# Patient Record
Sex: FEMALE | URBAN - NONMETROPOLITAN AREA
[De-identification: names, ages, dates, MRNs, and addresses within clinical notes are randomized per-mention and may not be internally consistent; named-entity substitution may affect disease eponyms.]

---

## 2021-12-07 ENCOUNTER — OUTSIDE SERVICES (OUTPATIENT)
Dept: FAMILY MEDICINE CLINIC | Age: 71
End: 2021-12-07

## 2021-12-07 DIAGNOSIS — R53.1 GENERALIZED WEAKNESS: ICD-10-CM

## 2021-12-07 DIAGNOSIS — U07.1 COVID-19: Primary | ICD-10-CM

## 2021-12-07 DIAGNOSIS — Z51.89 VISIT FOR WOUND CHECK: ICD-10-CM

## 2021-12-07 DIAGNOSIS — I48.11 LONGSTANDING PERSISTENT ATRIAL FIBRILLATION (HCC): ICD-10-CM

## 2021-12-07 PROBLEM — Z95.0 S/P PLACEMENT OF CARDIAC PACEMAKER: Status: ACTIVE | Noted: 2021-12-07

## 2021-12-14 ENCOUNTER — OUTSIDE SERVICES (OUTPATIENT)
Dept: FAMILY MEDICINE CLINIC | Age: 71
End: 2021-12-14

## 2021-12-14 DIAGNOSIS — R53.1 GENERALIZED WEAKNESS: ICD-10-CM

## 2021-12-14 DIAGNOSIS — U07.1 COVID-19: Primary | ICD-10-CM

## 2021-12-17 ENCOUNTER — TELEPHONE (OUTPATIENT)
Dept: FAMILY MEDICINE CLINIC | Age: 71
End: 2021-12-17

## 2021-12-17 NOTE — TELEPHONE ENCOUNTER
Incoming call from [address], pt has orders for insulin; Although pt doesn't have a diabetes dx and hasn't used insulin since being at the facility. Please advise.

## 2021-12-22 ASSESSMENT — ENCOUNTER SYMPTOMS
SHORTNESS OF BREATH: 0
DIARRHEA: 0
COUGH: 1
ABDOMINAL PAIN: 0
WHEEZING: 0
CONSTIPATION: 0
CHEST TIGHTNESS: 0

## 2021-12-22 NOTE — PROGRESS NOTES
[address]  [address] [address]  Dept: [phone]  Dept Fax: [phone]  Loc: [phone]    Uriel Machado  is a 73 y.o. female who presents today for her medical conditions/complaints as noted below. Uriel Machado is c/o of     Chief Complaint   Patient presents with    Extremity Weakness    Post-COVID Symptoms    Wound Check       HPI:   Toni Lima is being seen for her initial visit while at [facility]. Patient was admitted to [facility] on December 6, 2021, for rehab after being in the hospital with [facility]. She does not really feel like she is any better at this point, but she has only been here for two days. She continues to have a headache. She feels like she is coughing quite a bit and she is coughing a lot of mucus up. However, she is not feeling short of breath. She feels like therapy is going OK, but she has not had a lot of therapy yet and she has not noticed a huge change since starting therapy. She denies any problems with diarrhea. Patient reports that her appetite is quite good. She still generally feels quite weak. When she was healthy and living at home she was living with her brother and sister, who also contracted COVID and she is not quite sure how they are doing at this time. She said that when she was living at home, she did not need any assistive devices including a walker or cane, therefore her goal is to be able to be strong enough to walk on her own again in the near future. Past Medical History:   Diagnosis Date    Longstanding persistent atrial fibrillation ([address] 12/7/2021    S/P placement of cardiac pacemaker 12/7/2021     History reviewed. No pertinent surgical history. History reviewed. No pertinent family history.     Social History     Tobacco Use    Smoking status: Not on file    Smokeless tobacco: Not on file   Substance Use Topics    Alcohol use: Not on file      Prior to Visit Medications    Not on File     Not on File    Subjective:      Review of Systems   Constitutional: Negative for activity change, appetite change, chills, fatigue and fever. Respiratory: Positive for cough. Negative for chest tightness, shortness of breath and wheezing. Cardiovascular: Negative for chest pain, palpitations and leg swelling. Gastrointestinal: Negative for abdominal pain, constipation and diarrhea. Genitourinary: Negative for difficulty urinating. Skin: Negative for rash. Neurological: Positive for weakness and headaches. Negative for dizziness, syncope and light-headedness. Psychiatric/Behavioral: Positive for confusion and decreased concentration. Negative for behavioral problems, dysphoric mood and sleep disturbance. The patient is not nervous/anxious. Objective:     Physical Exam  Vitals and nursing note reviewed. Constitutional:       General: She is not in acute distress. Appearance: She is well-developed. Eyes:      Conjunctiva/sclera: Conjunctivae normal.   Neck:      Thyroid: No thyromegaly. Cardiovascular:      Rate and Rhythm: Normal rate and regular rhythm. Heart sounds: Normal heart sounds. No murmur heard. Pulmonary:      Effort: Pulmonary effort is normal. No respiratory distress. Breath sounds: Normal breath sounds. No wheezing. Abdominal:      General: Abdomen is flat. Bowel sounds are normal.      Palpations: Abdomen is soft. Tenderness: There is no abdominal tenderness. There is no guarding or rebound. Musculoskeletal:      Cervical back: Normal range of motion and neck supple. Lymphadenopathy:      Cervical: No cervical adenopathy. Skin:     General: Skin is warm and dry. Findings: No erythema or rash. Neurological:      General: No focal deficit present. Mental Status: She is alert. Mental status is at baseline.    Psychiatric:         Mood and Affect: Mood normal.         Speech: Speech normal.         Behavior: Behavior normal.         Cognition and Memory: Memory is impaired. Assessment:       Diagnosis Orders   1. COVID-19     2. Generalized weakness     3. Visit for wound check     4. Longstanding persistent atrial fibrillation ([address]               Plan:   1. COVID-19. Stable. She does not seem to be feeling significantly better yet. She continues to have a productive cough and she is still feeling very weak. At this point, she is not on oxygen and she is off of her antibiotics. I will continue to monitor. I will monitor closely for any signs of blood clotting including PE or DVT. 2. Generalized weakness. Stable. No significant improvement yet. She is working with therapy and she feels like it is going OK, but she has just not noticed anything that has gotten remarkably better yet. 3. Wound check. She does have some large excoriated areas on her bottom that nursing staff is treating with topical treatments and they did not have any concerns about how things are going at this time. 4. Atrial fibrillation. Stable. No recent issues with her heart rate. She feels like it has been pretty stable. No problems with a rapid heart rate. I will continue to monitor. Return in about 1 week (around 12/14/2021) for covid 19, generalized weekness. Patient given educational materials - see patient instructions. Discussed use, benefit, and side effects of prescribed medications. All patient questions answered. Patient voiced understanding. Reviewed health maintenance. Instructed to continue current medications, diet and exercise. Patient agreed with treatment plan. Follow up as directed. Prieto Acuna am personally transcribing for Beryl Rousseau MD 12/22/21 at 10:45 AM JUJU Jefferson MD, personally performed the services described in this document as transcribed by the , and it is both accurate and complete.     Electronically signed by Юлия Stafford MD on 12/23/21 at 2:13 PM EST

## 2021-12-28 ENCOUNTER — OUTSIDE SERVICES (OUTPATIENT)
Dept: PRIMARY CARE CLINIC | Age: 71
End: 2021-12-28

## 2021-12-28 DIAGNOSIS — I48.11 LONGSTANDING PERSISTENT ATRIAL FIBRILLATION (HCC): ICD-10-CM

## 2021-12-28 DIAGNOSIS — R53.1 GENERALIZED WEAKNESS: ICD-10-CM

## 2021-12-28 DIAGNOSIS — U07.1 COVID-19: Primary | ICD-10-CM

## 2021-12-29 ASSESSMENT — ENCOUNTER SYMPTOMS
ABDOMINAL PAIN: 0
CONSTIPATION: 0
COUGH: 0
SHORTNESS OF BREATH: 0
CHEST TIGHTNESS: 0
DIARRHEA: 0
WHEEZING: 0

## 2021-12-29 NOTE — PROGRESS NOTES
[address]  [address] [address]  Dept: [phone]  Dept Fax: [phone]  Loc: [phone]    Tamra Reinoso  is a 73 y.o. female who presents today for her medical conditions/complaints as noted below. Tamra Reinoso is c/o of     Chief Complaint   Patient presents with    Post-COVID Symptoms    Extremity Weakness       HPI:   Olivia Aguila is being seen for her regular weekly follow up while at [facility]. Overall, patient seems to be doing quite a bit better. She is much more awake today than she was last week. She says that she is feeling less short of breath and she is not coughing quite as much. She reports that she has been doing well with therapy and she feels that she is getting a little bit stronger. Patient denies any problems with abdominal pain, constipation, or diarrhea. She feels like her appetite has been pretty good recently. No issues with chest pain. She does have a mild headache. Otherwise she has been doing pretty well and she does not have really any concerns, she just wants to get better so that she can go back home. Nursing staff does not have any concerns about her either, they said that she is a little bit hard to give medication to but otherwise she has not had any problems. She did not need to use her walker by her report at home before she had COVID and she was living with her brother and sister, therefore her goal is to be pretty independent when she gets back home. Past Medical History:   Diagnosis Date    Longstanding persistent atrial fibrillation ([address] 12/7/2021    S/P placement of cardiac pacemaker 12/7/2021     History reviewed. No pertinent surgical history. History reviewed. No pertinent family history.     Social History     Tobacco Use    Smoking status: Unknown If Ever Smoked    Smokeless tobacco: Never Used   Substance Use Topics    Alcohol use: Not on file      Prior to Visit Medications    Not on File     Not on File    Subjective:      Review of Systems   Constitutional: Negative for activity change, appetite change, chills, fatigue and fever. Respiratory: Negative for cough, chest tightness, shortness of breath and wheezing. Cardiovascular: Negative for chest pain, palpitations and leg swelling. Gastrointestinal: Negative for abdominal pain, constipation and diarrhea. Genitourinary: Negative for difficulty urinating. Skin: Negative for rash. Neurological: Negative for dizziness, syncope, weakness, light-headedness and headaches. Psychiatric/Behavioral: Positive for confusion and decreased concentration. Negative for behavioral problems, dysphoric mood and sleep disturbance. The patient is not nervous/anxious. Objective:     Physical Exam  Vitals and nursing note reviewed. Constitutional:       General: She is not in acute distress. Appearance: She is well-developed. Eyes:      Conjunctiva/sclera: Conjunctivae normal.   Neck:      Thyroid: No thyromegaly. Cardiovascular:      Rate and Rhythm: Normal rate and regular rhythm. Heart sounds: Normal heart sounds. No murmur heard. Pulmonary:      Effort: Pulmonary effort is normal. No respiratory distress. Breath sounds: Normal breath sounds. No wheezing. Abdominal:      General: Abdomen is flat. Bowel sounds are normal.      Palpations: Abdomen is soft. Tenderness: There is no abdominal tenderness. There is no guarding or rebound. Musculoskeletal:      Cervical back: Normal range of motion and neck supple. Lymphadenopathy:      Cervical: No cervical adenopathy. Skin:     General: Skin is warm and dry. Findings: No erythema or rash. Neurological:      General: No focal deficit present. Mental Status: She is alert. Mental status is at baseline.    Psychiatric:         Mood and Affect: Mood normal.         Speech: Speech normal.         Behavior: Behavior normal.         Cognition and Memory: Memory is impaired. Assessment:       Diagnosis Orders   1. COVID-19     2. Generalized weakness               Plan:   1. COVID-19. Improving. She seems to be doing better overall. She does not have too much of a cough or shortness of breath at this point. Her strength is getting a little bit better. She seems to be recovering fairly well. 2. Generalized weakness. Improving. She has been doing well with therapy and she can tell that she is getting stronger. For now, I will have her continue therapy and she will go home when therapy feels that she is ready. Return in about 1 week (around 12/21/2021) for weakness. Patient given educational materials - see patient instructions. Discussed use, benefit, and side effects of prescribed medications. All patient questions answered. Patient voiced understanding. Reviewed health maintenance. Instructed to continue current medications, diet and exercise. Patient agreed with treatment plan. Follow up as directed. Jhonathan Sharpe am personally transcribing for Savannah Ho MD 12/29/21 at 10:06 AM EST. Mary Dickerson MD, personally performed the services described in this document as transcribed by the , and it is both accurate and complete.     Electronically signed by Savannah Ho MD on 12/30/21 at 5:13 PM EST

## 2022-01-07 ASSESSMENT — ENCOUNTER SYMPTOMS
DIARRHEA: 0
COUGH: 0
ABDOMINAL PAIN: 0
SHORTNESS OF BREATH: 0
CHEST TIGHTNESS: 0
WHEEZING: 0
CONSTIPATION: 0

## 2022-01-07 NOTE — PROGRESS NOTES
[address] [place] [address]  Dept: [phone]  Dept Fax: [phone]  Loc: [phone]    Yolanda Delacruz  is a 73 y.o. female who presents today for her medical conditions/complaints as noted below. Yolanda Delacruz is c/o of     Chief Complaint   Patient presents with    Extremity Weakness    Positive For Covid-19    Atrial Fibrillation       HPI:   Brandon Hazel is being seen for her regular weekly follow up while at [facility]. Overall, patient is doing quite a bit better. When I saw her today, she was sitting up in bed which that is actually the first time I have seen her sitting up. According to the nurse, she has been ambulating in her room on her own without assistance, which is an excellent sign. She also is walking with her walker with therapy and therapy has been impressed by her progress as well. Patient herself is frustrated as to how long it is taking to recover but I did remind her that having COVID and pneumonia is quite a bit on the system and that it is not surprising that it has been a slow recovery process. She can tell that she is feeling better. She is less short of breath and she is not coughing as much. She has not had any chest pains and no pains in her calves. She has not had any abdominal pain, constipation, or diarrhea. Her appetite is improving slowly. She has had some headaches recently but she found a little spot on her scalp that seems to be an ingrown hair and that seems to be where the headache is coming from because anytime she touches the area it is quite painful. I did examine the area and I feel a small little papule that seems to be inflamed. I told her that this should resolve on its own within a day or so.      Past Medical History:   Diagnosis Date    Longstanding persistent atrial fibrillation ([facility] [address] [facility] 12/7/2021    S/P placement of cardiac pacemaker 12/7/2021 Lymphadenopathy:      Cervical: No cervical adenopathy. Skin:     General: Skin is warm and dry. Findings: No erythema or rash. Comments: Small painful papule in the right vertex of her head. Neurological:      General: No focal deficit present. Mental Status: She is alert. Mental status is at baseline. Psychiatric:         Mood and Affect: Mood normal.         Speech: Speech normal.         Behavior: Behavior normal.         Cognition and Memory: Memory is impaired. Assessment:       Diagnosis Orders   1. COVID-19     2. Generalized weakness     3. Longstanding persistent atrial fibrillation ([address]               Plan:   1. COVID-19 pneumonia. Improving. She seems to be doing quite a bit better. This is the first time that I have seen her sitting up in bed and to hear that she is ambulating in her room on her own was very reassuring. Her respiratory status seems to be improving pretty remarkably as well as she is not on oxygen, she is much less short of breath, and not coughing as much. 2. Generalized weakness. Significantly improved. She is doing much better than she was two weeks ago. She is ambulating with a walker which seems to be going pretty well. Therapy is happy with her progress as well. I will continue to monitor. 3. Atrial fibrillation. Stable. No recent issues with her INR. She has not had any falls that would be concerning for and intracranial bleed. I will continue to monitor. Return in about 1 week (around 1/4/2022) for weakness. Patient given educational materials - see patient instructions. Discussed use, benefit, and side effects of prescribed medications. All patient questions answered. Patient voiced understanding. Reviewed health maintenance. Instructed to continue current medications, diet and exercise. Patient agreed with treatment plan. Follow up as directed.          Loraine Campo am personally transcribing for Alex Jin MD 1/7/22 at 10:33 AM ANNIE. Neris Gama MD, personally performed the services described in this document as transcribed by the , and it is both accurate and complete.     Electronically signed by Hussain Haynes MD on 1/7/22 at 12:13 PM EST

## 2022-01-11 ENCOUNTER — OUTSIDE SERVICES (OUTPATIENT)
Dept: FAMILY MEDICINE CLINIC | Age: 72
End: 2022-01-11

## 2022-01-11 DIAGNOSIS — U07.1 COVID-19: Primary | ICD-10-CM

## 2022-01-11 DIAGNOSIS — I48.11 LONGSTANDING PERSISTENT ATRIAL FIBRILLATION (HCC): ICD-10-CM

## 2022-01-11 DIAGNOSIS — R53.1 GENERALIZED WEAKNESS: ICD-10-CM

## 2022-01-11 DIAGNOSIS — M79.89 LEG SWELLING: ICD-10-CM

## 2022-01-18 ENCOUNTER — OUTSIDE SERVICES (OUTPATIENT)
Dept: FAMILY MEDICINE CLINIC | Age: 72
End: 2022-01-18

## 2022-01-18 DIAGNOSIS — R53.1 GENERALIZED WEAKNESS: Primary | ICD-10-CM

## 2022-01-18 DIAGNOSIS — R26.9 GAIT DISTURBANCE: ICD-10-CM

## 2022-01-24 ASSESSMENT — ENCOUNTER SYMPTOMS
SHORTNESS OF BREATH: 0
CHEST TIGHTNESS: 0
CONSTIPATION: 0
COUGH: 0
COLOR CHANGE: 1
DIARRHEA: 0
ABDOMINAL PAIN: 0
WHEEZING: 0

## 2022-01-24 NOTE — PROGRESS NOTES
[address]  [address] [address]  Dept: [phone]  Dept Fax: [phone]  Loc: [phone]    Finn Ruiz  is a 73 y.o. female who presents today for her medical conditions/complaints as noted below. Finn Ruiz is c/o of     Chief Complaint   Patient presents with    Extremity Weakness    Positive For Covid-19    Atrial Fibrillation       HPI:   Nandini Burgess is being seen for her regular weekly follow up while at [facility]. Overall, patient seems to be doing fairly well. Her biggest concern is that her legs are still quite swollen and very red at the ankles. This does appear to be bilateral and very even bilateral redness. She says that she does not remember that her legs are typically this red and she does not remember them typically being this swollen, but she did remember that she has been sitting with her legs down quite a bit and she has been eating a lot since her appetite has returned and her taste has come back. Patient is not short of breath and she is not having any issues with chest pains. She still does not feel that she is quite strong enough to go home. She is working with therapy and working hard doing her different exercises she just knows that she needs to be pretty independent at home and she does not feel like she is there yet. I did ask her if there was anything specific that she needed as far as DME equipment for home and she does not feel that there is anything that she needs at this time. Otherwise, she has not had any headaches. She feels that she has pretty much recovered from [place] other than the recent change in her legs and her weakness. Past Medical History:   Diagnosis Date    Longstanding persistent atrial fibrillation ([address] 12/7/2021    S/P placement of cardiac pacemaker 12/7/2021     History reviewed. No pertinent surgical history. History reviewed.  No pertinent family history. Social History     Tobacco Use    Smoking status: Unknown If Ever Smoked    Smokeless tobacco: Never Used   Substance Use Topics    Alcohol use: Not on file      Prior to Visit Medications    Not on File     Allergies   Allergen Reactions    Sulfa Antibiotics        Subjective:      Review of Systems   Constitutional: Negative for activity change, appetite change, chills, fatigue and fever. Respiratory: Negative for cough, chest tightness, shortness of breath and wheezing. Cardiovascular: Positive for leg swelling. Negative for chest pain and palpitations. Gastrointestinal: Negative for abdominal pain, constipation and diarrhea. Genitourinary: Negative for difficulty urinating. Skin: Positive for color change (erythema). Negative for rash. Neurological: Negative for dizziness, syncope, weakness, light-headedness and headaches. Psychiatric/Behavioral: Positive for confusion and decreased concentration. Negative for behavioral problems, dysphoric mood and sleep disturbance. The patient is not nervous/anxious. Objective:     Physical Exam  Vitals and nursing note reviewed. Constitutional:       General: She is not in acute distress. Appearance: She is well-developed. Eyes:      Conjunctiva/sclera: Conjunctivae normal.   Neck:      Thyroid: No thyromegaly. Cardiovascular:      Rate and Rhythm: Normal rate and regular rhythm. Heart sounds: Normal heart sounds. No murmur heard. Pulmonary:      Effort: Pulmonary effort is normal. No respiratory distress. Breath sounds: Normal breath sounds. No wheezing. Abdominal:      General: Abdomen is flat. Bowel sounds are normal.      Palpations: Abdomen is soft. Tenderness: There is no abdominal tenderness. There is no guarding or rebound. Musculoskeletal:      Cervical back: Normal range of motion and neck supple. Right lower le+ Pitting Edema present.       Left lower le+ Pitting Edema present. Lymphadenopathy:      Cervical: No cervical adenopathy. Skin:     General: Skin is warm and dry. Findings: No erythema or rash. Comments: Signs of stasis dermatitis to bilateral lower extremities. Neurological:      General: No focal deficit present. Mental Status: She is alert. Mental status is at baseline. Psychiatric:         Mood and Affect: Mood normal.         Speech: Speech normal.         Behavior: Behavior normal.         Cognition and Memory: Memory is impaired. Assessment:       Diagnosis Orders   1. COVID-19     2. Generalized weakness     3. Longstanding persistent atrial fibrillation ([facility] [address]     4. Leg swelling               Plan:   1. COVID-19. Improving. Overall, she seems to be doing a lot better and she is no longer coughing or short of breath thus she seems to have made it pretty close to full recovery. 2. Generalized weakness. Improving. She is making slow progress with therapy, but she knows feel like she is progressing, and therapy is happy with her progress. She just wishes that the progress was a bit faster. She does live at home with her brother and her sister who are both also recovering from [place]. Her sister is still in the nursing home as well and her brother is at home, therefore she needs to be fairly independent because there will not be anybody at home much stronger than herself to take care of her. 3. Atrial fibrillation. Stable. No recent issues with her heart rate. She has not had any problems with chest pains or shortness of breath. 4. Leg swelling. Stable. There was concerns for possible Cellulitis due to the erythema earlier in the week, so I had started her on doxycycline, but it has not really made any improvement in her leg swelling or the redness. After examining it I suspect that it is more of a stasis dermatitis and so I put her on Lasix for a few days to see if that will help with some of the swelling.         Return in about 1 week (around 1/18/2022) for weakness. Patient given educational materials - see patient instructions. Discussed use, benefit, and side effects of prescribed medications. All patient questions answered. Patient voiced understanding. Reviewed health maintenance. Instructed to continue current medications, diet and exercise. Patient agreed with treatment plan. Follow up as directed. Devan Morris am personally transcribing for Alex Wallace MD 1/24/22 at 9:39 AM EST. Abran Yanes MD, personally performed the services described in this document as transcribed by the , and it is both accurate and complete.     Electronically signed by Alex Wallace MD on 1/24/22 at 5:25 PM EST

## 2022-01-25 ENCOUNTER — OUTSIDE SERVICES (OUTPATIENT)
Dept: PRIMARY CARE CLINIC | Age: 72
End: 2022-01-25

## 2022-01-25 DIAGNOSIS — M79.89 LEG SWELLING: ICD-10-CM

## 2022-01-25 DIAGNOSIS — R53.1 GENERALIZED WEAKNESS: Primary | ICD-10-CM

## 2022-01-28 ASSESSMENT — ENCOUNTER SYMPTOMS
CONSTIPATION: 0
ABDOMINAL PAIN: 0
CHEST TIGHTNESS: 0
DIARRHEA: 0
SHORTNESS OF BREATH: 0
WHEEZING: 0
COUGH: 0

## 2022-02-03 ASSESSMENT — ENCOUNTER SYMPTOMS
WHEEZING: 0
CONSTIPATION: 0
COUGH: 0
SHORTNESS OF BREATH: 0
ABDOMINAL PAIN: 0
CHEST TIGHTNESS: 0
DIARRHEA: 0

## 2022-02-03 NOTE — PROGRESS NOTES
[place] [address]  [address] [place] [address]  Dept: [phone]  Dept Fax: [phone]    Fouzia Carreno  is a 73 y.o. female who presents today for her medical conditions/complaints as noted below. Fouzia Carreno is c/o of     Chief Complaint   Patient presents with    Extremity Weakness    Leg Swelling       HPI:   Jaime Ricardo is being seen for her regular weekly follow up while at [address]. Overall, patient has been doing very well. She was walking with physical therapy with just her walker and not requiring much assistance at all. She continues to have swelling in her legs but it has really not worsened at all and she is not having any pain or erythema. She mentioned that she pretty consistently has redness in her legs so it is not anything that she is really worried about. Her appetite continues to be good. She is not having any problems with chest pains or shortness of breath. She is not coughing at all. She is planning to go home early next week. She does not have anything that she needs from home as far as DME equipment, as she has everything that she needs. She is just waiting to be a little bit stronger. Past Medical History:   Diagnosis Date    Longstanding persistent atrial fibrillation ([address] 12/7/2021    S/P placement of cardiac pacemaker 12/7/2021     History reviewed. No pertinent surgical history. History reviewed. No pertinent family history. Social History     Tobacco Use    Smoking status: Unknown If Ever Smoked    Smokeless tobacco: Never Used   Substance Use Topics    Alcohol use: Not on file      Prior to Visit Medications    Not on File     Allergies   Allergen Reactions    Sulfa Antibiotics        Subjective:      Review of Systems   Constitutional: Negative for activity change, appetite change, chills, fatigue and fever.    Respiratory: Negative for cough, chest tightness, shortness of breath and wheezing. Cardiovascular: Positive for leg swelling. Negative for chest pain and palpitations. Gastrointestinal: Negative for abdominal pain, constipation and diarrhea. Genitourinary: Negative for difficulty urinating. Skin: Negative for rash. Neurological: Negative for dizziness, syncope, weakness, light-headedness and headaches. Psychiatric/Behavioral: Positive for confusion and decreased concentration. Negative for behavioral problems, dysphoric mood and sleep disturbance. The patient is not nervous/anxious. Objective:     Physical Exam  Vitals and nursing note reviewed. Constitutional:       General: She is not in acute distress. Appearance: She is well-developed. Eyes:      Conjunctiva/sclera: Conjunctivae normal.   Neck:      Thyroid: No thyromegaly. Cardiovascular:      Rate and Rhythm: Normal rate and regular rhythm. Heart sounds: Normal heart sounds. No murmur heard. Pulmonary:      Effort: Pulmonary effort is normal. No respiratory distress. Breath sounds: Normal breath sounds. No wheezing. Abdominal:      General: Abdomen is flat. Bowel sounds are normal.      Palpations: Abdomen is soft. Tenderness: There is no abdominal tenderness. There is no guarding or rebound. Musculoskeletal:      Cervical back: Normal range of motion and neck supple. Right lower le+ Pitting Edema present. Left lower le+ Pitting Edema present. Lymphadenopathy:      Cervical: No cervical adenopathy. Skin:     General: Skin is warm and dry. Findings: No erythema or rash. Neurological:      General: No focal deficit present. Mental Status: She is alert. Mental status is at baseline. Psychiatric:         Mood and Affect: Mood normal.         Speech: Speech normal.         Behavior: Behavior normal.         Cognition and Memory: Memory is impaired.            Assessment:       Diagnosis Orders   1. Generalized weakness     2. Leg swelling               Plan:   1. Generalized weakness. Improving. She has been doing very well. She has been walking a lot more with therapy. She feels that she is getting a lot stronger. She will be able to be discharged home next week. 2. Leg swelling. Stable. No recent changes in this. She continues to be quite swollen, but she said that this is pretty close to her normal. There was not a significant change with the Lasix. I will continue to monitor. Return in about 2 weeks (around 2/8/2022) for leg swelling. Patient given educational materials - see patient instructions. Discussed use, benefit, and side effects of prescribed medications. All patient questions answered. Patient voiced understanding. Reviewed health maintenance. Instructed to continue current medications, diet and exercise. Patient agreed with treatment plan. Follow up as directed. Maria Adhikari am personally transcribing for Kristina Michaud MD 2/3/22 at 1:14 PM EST. Kelsey Vale MD, personally performed the services described in this document as transcribed by the , and it is both accurate and complete.     Electronically signed by Kristina Michaud MD on 2/10/22 at 2:39 PM EST

## 2022-02-15 ENCOUNTER — OUTSIDE SERVICES (OUTPATIENT)
Dept: FAMILY MEDICINE CLINIC | Age: 72
End: 2022-02-15

## 2022-02-15 DIAGNOSIS — M79.89 LEG SWELLING: ICD-10-CM

## 2022-02-15 DIAGNOSIS — R53.1 GENERALIZED WEAKNESS: Primary | ICD-10-CM

## 2022-02-15 DIAGNOSIS — K52.9 ACUTE GASTROENTERITIS: ICD-10-CM

## 2022-02-21 ASSESSMENT — ENCOUNTER SYMPTOMS
CONSTIPATION: 0
WHEEZING: 0
NAUSEA: 1
VOMITING: 1
DIARRHEA: 1
CHEST TIGHTNESS: 0
SHORTNESS OF BREATH: 0
COUGH: 0
ABDOMINAL PAIN: 0

## 2022-02-21 NOTE — PROGRESS NOTES
[address]  [address] [address]  Dept: [phone]  Dept Fax: [phone]  Loc: [phone]    Fouzia Carreno  is a 73 y.o. female who presents today for her medical conditions/complaints as noted below. Fouzia Carreno is c/o of     Chief Complaint   Patient presents with    Leg Swelling    Extremity Weakness       HPI:   Jaime Ricardo is being seen for her regular weekly follow up while at [address]. Overall, she has been OK. I thought that she would have been going home by now but our  mentioned that she has been having a lot of trouble getting [place] family to return calls and it seems that the plan is for Ofelia to go home with the family and they have been a little bit resistant for that. There has also been some recent discussion by her family that maybe she will stay in a nursing facility long term, which I do not believe Jaime Ricardo is aware of as she is still planning have to go home, so we will see how that goes. Otherwise, Jaime Ricardo continues to have problems with swelling in her legs and recently she has had some open sores on her shins that have been seeping a clear fluid so I had put her on Lasix about a week ago but it has not made a significant difference. I think that she is probably going to need some wraps done on her legs in order to help control the swelling a little bit better since the Lasix does not seem to be helping and now she has some open sores. Her legs do not appear to be infected at this point. It is just that she had open areas that are seeping a clear fluid and I anticipate that having this open area is only going to lead to possible infections if we do not treat it.  Other problems Ofelia has been feeling a lot weaker over the last couple of days to the point that she needed help getting out of bed yesterday and typically she walks around her room without any difficulties, but she required some assistance this morning as well. It seems after talking to her that she has had a GI bug over the last few days with significant nausea and vomiting earlier in the week and then some pretty significant diarrhea yesterday. She was able to eat breakfast today and keep it down, so she is hopeful that she is on the mend. She has not had any more diarrhea yet this morning, and she is hoping that she will continue to improve. Overall today, she just seemed kind of down and almost a little bit dejected which I assume has to do with the fact that she is not feeling well and she is still here. Past Medical History:   Diagnosis Date    Longstanding persistent atrial fibrillation ([facility] [address] 12/7/2021    S/P placement of cardiac pacemaker 12/7/2021     History reviewed. No pertinent surgical history. History reviewed. No pertinent family history. Social History     Tobacco Use    Smoking status: Unknown If Ever Smoked    Smokeless tobacco: Never Used   Substance Use Topics    Alcohol use: Not on file      Prior to Visit Medications    Not on File     Allergies   Allergen Reactions    Sulfa Antibiotics        Subjective:      Review of Systems   Constitutional: Negative for activity change, appetite change, chills, fatigue and fever. Respiratory: Negative for cough, chest tightness, shortness of breath and wheezing. Cardiovascular: Negative for chest pain, palpitations and leg swelling. Gastrointestinal: Positive for diarrhea, nausea and vomiting. Negative for abdominal pain and constipation. Genitourinary: Negative for difficulty urinating. Skin: Negative for rash. Neurological: Positive for weakness. Negative for dizziness, syncope, light-headedness and headaches. Psychiatric/Behavioral: Positive for confusion and decreased concentration. Negative for behavioral problems, dysphoric mood and sleep disturbance. The patient is not nervous/anxious.         Objective: Physical Exam  Vitals and nursing note reviewed. Constitutional:       General: She is not in acute distress. Appearance: She is well-developed. Eyes:      Conjunctiva/sclera: Conjunctivae normal.   Neck:      Thyroid: No thyromegaly. Cardiovascular:      Rate and Rhythm: Normal rate and regular rhythm. Heart sounds: Normal heart sounds. No murmur heard. Pulmonary:      Effort: Pulmonary effort is normal. No respiratory distress. Breath sounds: Normal breath sounds. No wheezing. Abdominal:      General: Abdomen is flat. Bowel sounds are normal.      Palpations: Abdomen is soft. Tenderness: There is no abdominal tenderness. There is no guarding or rebound. Musculoskeletal:      Cervical back: Normal range of motion and neck supple. Right lower leg: Edema (Small open areas on bilateral shins near ankles that are seeping a clear fluid. No signs of infection. No surrounding erythema. No warmth. ) present. Left lower leg: Edema present. Lymphadenopathy:      Cervical: No cervical adenopathy. Skin:     General: Skin is warm and dry. Findings: No erythema or rash. Neurological:      General: No focal deficit present. Mental Status: She is alert. Mental status is at baseline. Psychiatric:         Mood and Affect: Mood is depressed. Speech: Speech normal.         Behavior: Behavior is withdrawn. Cognition and Memory: Memory is impaired. Assessment:       Diagnosis Orders   1. Generalized weakness     2. Leg swelling     3. Acute gastroenteritis               Plan:   1. Generalized weakness. Worsening. Today she is doing a lot worse than she was a couple of weeks ago because she has recently been sick with a stomach bug. She is hoping that now that she has been able to eat a little bit better and move around that she will start to get some of her strength back fairly quickly. 2. Like swelling. Stable.  No recent change despite me adding Lasix again. I will have nursing apply Unna boots to her legs to try to help with the swelling and to help heal up the sores that she does have. The swelling of her legs has been a chronic ongoing issue since she got here. 3. Gastroenteritis. Improving. She has been sick over the last couple of days, but she is feeling a little bit better today. She is hopeful that she is on the mend and she will be able to recover fully from this within the next few days. Return in about 1 week (around 2/22/2022) for weakness. Patient given educational materials - see patient instructions. Discussed use, benefit, and side effects of prescribed medications. All patient questions answered. Patient voiced understanding. Reviewed health maintenance. Instructed to continue current medications, diet and exercise. Patient agreed with treatment plan. Follow up as directed. Andrew Ocampo am personally transcribing for Gisele Sanz MD 2/21/22 at 10:54 AM EST. Edgardo Clarke MD, personally performed the services described in this document as transcribed by the , and it is both accurate and complete.     Electronically signed by Gisele Sanz MD on 2/21/22 at 8:35 PM EST

## 2022-03-08 ENCOUNTER — OUTSIDE SERVICES (OUTPATIENT)
Dept: FAMILY MEDICINE CLINIC | Age: 72
End: 2022-03-08

## 2022-03-08 DIAGNOSIS — L03.116 BILATERAL LOWER LEG CELLULITIS: Primary | ICD-10-CM

## 2022-03-08 DIAGNOSIS — L03.115 BILATERAL LOWER LEG CELLULITIS: Primary | ICD-10-CM

## 2022-03-08 DIAGNOSIS — I48.11 LONGSTANDING PERSISTENT ATRIAL FIBRILLATION (HCC): ICD-10-CM

## 2022-03-08 DIAGNOSIS — R53.1 GENERALIZED WEAKNESS: ICD-10-CM

## 2022-03-08 DIAGNOSIS — M79.89 LEG SWELLING: ICD-10-CM

## 2022-03-11 ASSESSMENT — ENCOUNTER SYMPTOMS
WHEEZING: 0
SHORTNESS OF BREATH: 0
COLOR CHANGE: 1
COUGH: 0
ABDOMINAL PAIN: 0
CHEST TIGHTNESS: 0
DIARRHEA: 0
CONSTIPATION: 0

## 2022-03-11 NOTE — PROGRESS NOTES
[address]  [address] [id number]  Dept: [phone]  Dept Fax: [phone]  Loc: [phone]    Fouzia Carreno  is a 73 y.o. female who presents today for her medical conditions/complaints as noted below. Fouzia Carreno is c/o of     Chief Complaint   Patient presents with    Leg Swelling    Atrial Fibrillation       HPI:   Jaime Ricardo is being seen for her regular monthly follow up while at [facility]. Patient has been having some trouble over the last week or so with increased redness and pain of her lower extremities. Nursing staff has been concerned that she may have cellulitis of her lower legs. She has continued to be wrapped with Unna boots, but this has been progressively worsening despite the wraps. She does have a history of recurrent cellulitis in the lower legs. Today she says that this does feel similar to when she has had cellulitis in the past. She has not had any problems with fevers or chills and she otherwise has been feeling good. I was unable to examine her legs thoroughly today due to her wraps as they were just changed yesterday and are not due to be changed for several more days. However, the patient and nursing staff have both stated that it does seem to be worse than normal thus she will likely need some antibiotics for cellulitis. I also talk to her and it sounds like she is planning on staying at [facility] long term. Her sister finally got back with our  regarding discharge planning and had decided that it would be best if Jaime Ricardo stayed in a facility and her sister had suggested maybe one closer to [facility] where she used to live but Jaime Ricardo said that she is perfectly happy at [facility] so she plans to stay.      Past Medical History:   Diagnosis Date    Longstanding persistent atrial fibrillation ([address] 12/7/2021    S/P placement of cardiac pacemaker 12/7/2021 History reviewed. No pertinent surgical history. History reviewed. No pertinent family history. Social History     Tobacco Use    Smoking status: Unknown If Ever Smoked    Smokeless tobacco: Never Used   Substance Use Topics    Alcohol use: Not on file      Prior to Visit Medications    Not on File     Allergies   Allergen Reactions    Sulfa Antibiotics        Subjective:      Review of Systems   Constitutional: Negative for activity change, appetite change, chills, fatigue and fever. Respiratory: Negative for cough, chest tightness, shortness of breath and wheezing. Cardiovascular: Positive for leg swelling. Negative for chest pain and palpitations. Gastrointestinal: Negative for abdominal pain, constipation and diarrhea. Genitourinary: Negative for difficulty urinating. Skin: Positive for color change (erythema). Negative for rash. Neurological: Negative for dizziness, syncope, weakness, light-headedness and headaches. Psychiatric/Behavioral: Positive for confusion and decreased concentration. Negative for behavioral problems, dysphoric mood and sleep disturbance. The patient is not nervous/anxious. Objective:     Physical Exam  Vitals and nursing note reviewed. Constitutional:       General: She is not in acute distress. Appearance: She is well-developed. Eyes:      Conjunctiva/sclera: Conjunctivae normal.   Neck:      Thyroid: No thyromegaly. Cardiovascular:      Rate and Rhythm: Normal rate and regular rhythm. Heart sounds: Normal heart sounds. No murmur heard. Pulmonary:      Effort: Pulmonary effort is normal. No respiratory distress. Breath sounds: Normal breath sounds. No wheezing. Abdominal:      General: Abdomen is flat. Bowel sounds are normal.      Palpations: Abdomen is soft. Tenderness: There is no abdominal tenderness. There is no guarding or rebound. Musculoskeletal:      Cervical back: Normal range of motion and neck supple. Comments: Bilateral lower extremities wrapped. No erythema visible above or below the wraps but patient reports legs are red. Lymphadenopathy:      Cervical: No cervical adenopathy. Skin:     General: Skin is warm and dry. Findings: No erythema or rash. Neurological:      General: No focal deficit present. Mental Status: She is alert. Mental status is at baseline. Psychiatric:         Mood and Affect: Mood normal.         Speech: Speech normal.         Behavior: Behavior normal.         Cognition and Memory: Memory is impaired. Assessment:       Diagnosis Orders   1. Bilateral lower leg cellulitis     2. Generalized weakness     3. Leg swelling     4. Longstanding persistent atrial fibrillation ([address]               Plan:   1. Bilateral lower extremity cellulitis. New. Likely due to dependent edema as patient regularly sits with legs hanging down. I will treat with doxycycline for 10 days. 2. Generalized weakness. Stable. No recent changes and the last several weeks. That decision has been made for her to stay in a nursing facility long term and she has chosen to stay at [facility] thus she will become a permanent resident. 3. Leg swelling. Stable. No recent changes. The wraps have not made a huge difference in how much swelling she has but it does seem to keep it from getting worse and she has not had any further draining from the swelling therefore it is helping some. 4. Atrial fibrillation. Stable. No recent issues with palpitations, chest pains, or shortness of breath. I will continue to monitor. Return in about 1 month (around 4/8/2022) for leg swelling. Patient given educational materials - see patient instructions. Discussed use, benefit, and side effects of prescribed medications. All patient questions answered. Patient voiced understanding. Reviewed health maintenance. Instructed to continue current medications, diet and exercise.   Patient agreed with

## 2022-04-05 ENCOUNTER — OUTSIDE SERVICES (OUTPATIENT)
Dept: PRIMARY CARE CLINIC | Age: 72
End: 2022-04-05

## 2022-04-05 DIAGNOSIS — I48.11 LONGSTANDING PERSISTENT ATRIAL FIBRILLATION (HCC): Primary | ICD-10-CM

## 2022-04-05 DIAGNOSIS — M79.89 LEG SWELLING: ICD-10-CM

## 2022-04-13 ASSESSMENT — ENCOUNTER SYMPTOMS
DIARRHEA: 0
CONSTIPATION: 0
WHEEZING: 0
SHORTNESS OF BREATH: 0
CHEST TIGHTNESS: 0
ABDOMINAL PAIN: 0
COUGH: 0

## 2022-04-13 NOTE — PROGRESS NOTES
Tobacco Use    Smoking status: Unknown If Ever Smoked    Smokeless tobacco: Never Used   Substance Use Topics    Alcohol use: Not on file      Prior to Visit Medications    Not on File     Allergies   Allergen Reactions    Sulfa Antibiotics        Subjective:      Review of Systems   Constitutional: Negative for activity change, appetite change, chills, fatigue and fever. Respiratory: Negative for cough, chest tightness, shortness of breath and wheezing. Cardiovascular: Positive for leg swelling. Negative for chest pain and palpitations. Gastrointestinal: Negative for abdominal pain, constipation and diarrhea. Genitourinary: Negative for difficulty urinating. Skin: Negative for rash. Neurological: Negative for dizziness, syncope, weakness, light-headedness and headaches. Psychiatric/Behavioral: Positive for confusion and decreased concentration. Negative for behavioral problems, dysphoric mood and sleep disturbance. The patient is not nervous/anxious. Objective:     Physical Exam  Vitals and nursing note reviewed. Constitutional:       General: She is not in acute distress. Appearance: She is well-developed. Eyes:      Conjunctiva/sclera: Conjunctivae normal.   Neck:      Thyroid: No thyromegaly. Cardiovascular:      Rate and Rhythm: Normal rate and regular rhythm. Heart sounds: Normal heart sounds. No murmur heard. Pulmonary:      Effort: Pulmonary effort is normal. No respiratory distress. Breath sounds: Normal breath sounds. No wheezing. Abdominal:      General: Abdomen is flat. Bowel sounds are normal.      Palpations: Abdomen is soft. Tenderness: There is no abdominal tenderness. There is no guarding or rebound. Musculoskeletal:      Cervical back: Normal range of motion and neck supple. Right lower le+ Pitting Edema present. Left lower le+ Pitting Edema present. Lymphadenopathy:      Cervical: No cervical adenopathy.    Skin: General: Skin is warm and dry. Findings: No erythema or rash. Neurological:      General: No focal deficit present. Mental Status: She is alert. Mental status is at baseline. Psychiatric:         Mood and Affect: Mood normal.         Speech: Speech normal.         Behavior: Behavior normal.         Cognition and Memory: Memory is impaired. Assessment:       Diagnosis Orders   1. Longstanding persistent atrial fibrillation (HCC)     2. Leg swelling               Plan:   1. Atrial fibrillation. Stable. No recent issues with rapid heart rate or chest pains. I will continue to monitor. 2. Leg swelling. Slightly improved. She is doing a little bit better with her Unna boots. She does elevate her feet at night although she does not elevate them at all during the day. I will continue to monitor. Return in about 1 month (around 5/5/2022) for afib. Patient given educational materials - see patient instructions. Discussed use, benefit, and side effects of prescribed medications. All patient questions answered. Patient voiced understanding. Reviewed health maintenance. Instructed to continue current medications, diet and exercise. Patient agreed with treatment plan. Follow up as directed. Mathew Rivera am personally transcribing for Fransisca Rai MD 4/13/22 at 11:21 AM EDT. Sofia Craft MD, personally performed the services described in this document as transcribed by the , and it is both accurate and complete.     Electronically signed by Fransisca Rai MD on 4/14/22 at 4:54 PM EDT

## 2022-04-25 ENCOUNTER — TELEPHONE (OUTPATIENT)
Dept: FAMILY MEDICINE CLINIC | Age: 72
End: 2022-04-25

## 2022-04-25 RX ORDER — ACETAMINOPHEN 325 MG/1
650 TABLET ORAL EVERY 4 HOURS PRN
COMMUNITY

## 2022-04-25 RX ORDER — BUMETANIDE 2 MG/1
2 TABLET ORAL DAILY
COMMUNITY

## 2022-04-25 RX ORDER — NYSTATIN 10B UNIT
POWDER (EA) MISCELLANEOUS 2 TIMES DAILY
COMMUNITY

## 2022-05-03 ENCOUNTER — OUTSIDE SERVICES (OUTPATIENT)
Dept: PRIMARY CARE CLINIC | Age: 72
End: 2022-05-03

## 2022-05-03 DIAGNOSIS — L30.9 DERMATITIS: ICD-10-CM

## 2022-05-03 DIAGNOSIS — I48.11 LONGSTANDING PERSISTENT ATRIAL FIBRILLATION (HCC): ICD-10-CM

## 2022-05-03 DIAGNOSIS — M79.89 LEG SWELLING: Primary | ICD-10-CM

## 2022-05-04 ENCOUNTER — TELEPHONE (OUTPATIENT)
Dept: FAMILY MEDICINE CLINIC | Age: 72
End: 2022-05-04

## 2022-05-19 ASSESSMENT — ENCOUNTER SYMPTOMS
CHEST TIGHTNESS: 0
SHORTNESS OF BREATH: 0
DIARRHEA: 0
COUGH: 0
ABDOMINAL PAIN: 0
CONSTIPATION: 0
WHEEZING: 0

## 2022-05-19 NOTE — PROGRESS NOTES
[place] [address]  [address] [place], [place] [id number]  Dept: [phone]  Dept Fax: [phone]    Rose Jacome  is a 81 y.o. female who presents today for her medical conditions/complaints as noted below. Rose Jacome is c/o of     Chief Complaint   Patient presents with    Leg Swelling    Atrial Fibrillation    Rash       HPI:   Flip Amin is being seen for her regular monthly follow up while at [facility]. Last time I saw her, she was doing quite well, and she continues to be doing well. Last week, nurses had asked me to stop in and look at her legs for a moment because we had run out of wraps, so her legs had not been wrapped in several days and it had caused her legs to swell and her skin too split open. When I saw her last week she had some erythema with some clear exudate drainage to her shins bilaterally, but they were not warm, not overly tender, and no signs of infection, it was just due to the increased swelling. Due to this I had recommended that she elevate her legs a little bit more and I advised nursing staff to make sure that she is getting wrapped if possible. This week when I saw her, her legs had been wrapped again as a new shipment of the wraps had come in. She said that there has not been any more issues or signs of infection. Nothing has worsened in fact everything has gotten slightly better. Otherwise, patient does not have any complaints. Nursing staff had mentioned that she had been a little bit itchy over the last couple of days and I suggested that they use moisturizer, hydrocortisone cream, and Benadryl as needed. Patient does not remember anyone putting any cream on her back recently and she does not think she had any Benadryl, but the itching was not bothering her until I asked her about it specifically.  Otherwise, no problems with abdominal pain, constipation, or diarrhea. She has been eating very well and she finished her entire tray while I was sitting talking to her. Past Medical History:   Diagnosis Date    Longstanding persistent atrial fibrillation ([facility] [address] 12/7/2021    S/P placement of cardiac pacemaker 12/7/2021     History reviewed. No pertinent surgical history. History reviewed. No pertinent family history. Social History     Tobacco Use    Smoking status: Unknown If Ever Smoked    Smokeless tobacco: Never Used   Substance Use Topics    Alcohol use: Not on file      Prior to Visit Medications    Medication Sig Taking? Authorizing Provider   acetaminophen (TYLENOL) 325 MG tablet Take 650 mg by mouth every 4 hours as needed  Historical Provider, MD   apixaban (ELIQUIS) 2.5 MG TABS tablet Take 2.5 mg by mouth 2 times daily  Historical Provider, MD   bumetanide (BUMEX) 2 MG tablet Take 2 mg by mouth daily  Historical Provider, MD   nystatin (MYCOSTATIN) POWD powder Apply topically 2 times daily Apply to affected area topically BID for antifungal  Historical Provider, MD     Allergies   Allergen Reactions    Sulfa Antibiotics        Subjective:      Review of Systems   Constitutional: Negative for activity change, appetite change, chills, fatigue and fever. Respiratory: Negative for cough, chest tightness, shortness of breath and wheezing. Cardiovascular: Positive for leg swelling. Negative for chest pain and palpitations. Gastrointestinal: Negative for abdominal pain, constipation and diarrhea. Genitourinary: Negative for difficulty urinating. Skin: Negative for rash. Neurological: Negative for dizziness, syncope, weakness, light-headedness and headaches. Psychiatric/Behavioral: Positive for confusion and decreased concentration. Negative for behavioral problems, dysphoric mood and sleep disturbance. The patient is not nervous/anxious. Objective:     Physical Exam  Vitals and nursing note reviewed.    Constitutional: General: She is not in acute distress. Appearance: She is well-developed. Eyes:      Conjunctiva/sclera: Conjunctivae normal.   Neck:      Thyroid: No thyromegaly. Cardiovascular:      Rate and Rhythm: Normal rate and regular rhythm. Heart sounds: Normal heart sounds. No murmur heard. Pulmonary:      Effort: Pulmonary effort is normal. No respiratory distress. Breath sounds: Normal breath sounds. No wheezing. Abdominal:      General: Abdomen is flat. Bowel sounds are normal.      Palpations: Abdomen is soft. Tenderness: There is no abdominal tenderness. There is no guarding or rebound. Musculoskeletal:      Cervical back: Normal range of motion and neck supple. Right lower le+ Pitting Edema present. Left lower le+ Pitting Edema present. Lymphadenopathy:      Cervical: No cervical adenopathy. Skin:     General: Skin is warm and dry. Findings: No erythema or rash. Neurological:      General: No focal deficit present. Mental Status: She is alert. Mental status is at baseline. Psychiatric:         Mood and Affect: Mood normal.         Speech: Speech normal.         Behavior: Behavior normal.         Cognition and Memory: Memory is impaired. Assessment:       Diagnosis Orders   1. Leg swelling     2. Longstanding persistent atrial fibrillation ([address]     3. Dermatitis               Plan:   1. Leg swelling. Improving. She has been doing a lot better since having her legs wrapped again. The raps really do seem to benefit her. Hopefully we can continue the wraps for the long term since she does not tolerate compression socks. She also seems very resistant to keeping her legs elevated and other than continually encouraging there is not much else we can do. 2. Atrial fibrillation. Stable. No recent issues with feeling like her heart is out of rhythm. No issues with hypotension. She has not had any chest pain.  I will continue to monitor. 3. Dermatitis. New. Likely a combination of dry skin or sweating from laying back in her chair all the time. Hi did recommend hydrocortisone cream as needed in addition to moisturizer and Benadryl. Return in about 1 month (around 6/3/2022) for afib. Patient given educational materials - see patient instructions. Discussed use, benefit, and side effects of prescribed medications. All patient questions answered. Patient voiced understanding. Reviewed health maintenance. Instructed to continue current medications, diet and exercise. Patient agreed with treatment plan. Follow up as directed. Marla Chau am personally transcribing for Asmita Plaza MD 5/19/22 at 4:19 PM EDT. Jose Calles MD, personally performed the services described in this document as transcribed by the , and it is both accurate and complete.     Electronically signed by Asmita Plaza MD on 5/23/22 at 1:35 PM EDT

## 2022-06-07 ENCOUNTER — OUTSIDE SERVICES (OUTPATIENT)
Dept: PRIMARY CARE CLINIC | Age: 72
End: 2022-06-07

## 2022-06-07 DIAGNOSIS — M79.89 LEG SWELLING: Primary | ICD-10-CM

## 2022-06-07 DIAGNOSIS — M25.552 BILATERAL HIP PAIN: ICD-10-CM

## 2022-06-07 DIAGNOSIS — M25.551 BILATERAL HIP PAIN: ICD-10-CM

## 2022-06-07 DIAGNOSIS — I48.11 LONGSTANDING PERSISTENT ATRIAL FIBRILLATION (HCC): ICD-10-CM

## 2022-06-17 ASSESSMENT — ENCOUNTER SYMPTOMS
CONSTIPATION: 0
CHEST TIGHTNESS: 0
DIARRHEA: 0
ABDOMINAL PAIN: 0
SHORTNESS OF BREATH: 0
COUGH: 0
WHEEZING: 0

## 2022-06-17 NOTE — PROGRESS NOTES
[place] [address]  [address] [place] [address]  Dept: [phone]  Dept Fax: [phone]    Delonte Guerrero  is a 81 y.o. female who presents today for her medical conditions/complaints as noted below. Delonte Guerrero is c/o of     Chief Complaint   Patient presents with    Leg Swelling    Hip Pain    Atrial Fibrillation       HPI:   Kavya Vega is being seen for her regular monthly follow up while at [address]. Overall, she has been doing pretty well. No recent concerns. Wound care is still taking care of her legs and is treating the infection topically. The swelling in her legs has not been too bad recently. Her feet are not swollen at all which leads me to think that some of the weight in her legs is just due to her obesity. There is no sign of pitting edema today. One concern that the wound doctor has shared is that [place] seems to be having pain in her hips while the wound doctor was doing her assessment and treatments, so I asked patient about it and she said that she only has pain in her hips when her legs are being lifted up quite high. The pain does not bother her when she is walking, and she does not have any pain when rolling over in bed. Overall, she just feels that her legs are being lifted up too high while she is having her wound treatments done. Otherwise, patient denies any problems with chest pains or shortness of breath. She seems to be doing quite well overall. Past Medical History:   Diagnosis Date    Longstanding persistent atrial fibrillation ([address] 12/7/2021    S/P placement of cardiac pacemaker 12/7/2021     History reviewed. No pertinent surgical history. History reviewed. No pertinent family history.     Social History     Tobacco Use    Smoking status: Unknown If Ever Smoked    Smokeless tobacco: Never Used   Substance Use Topics    Alcohol use: Not on file      Prior to Visit Medications    Medication Sig Taking? Authorizing Provider   acetaminophen (TYLENOL) 325 MG tablet Take 650 mg by mouth every 4 hours as needed  Historical Provider, MD   apixaban (ELIQUIS) 2.5 MG TABS tablet Take 2.5 mg by mouth 2 times daily  Historical Provider, MD   bumetanide (BUMEX) 2 MG tablet Take 2 mg by mouth daily  Historical Provider, MD   nystatin (MYCOSTATIN) POWD powder Apply topically 2 times daily Apply to affected area topically BID for antifungal  Historical Provider, MD     Allergies   Allergen Reactions    Sulfa Antibiotics        Subjective:      Review of Systems   Constitutional: Negative for activity change, appetite change, chills, fatigue and fever. Respiratory: Negative for cough, chest tightness, shortness of breath and wheezing. Cardiovascular: Negative for chest pain, palpitations and leg swelling. Gastrointestinal: Negative for abdominal pain, constipation and diarrhea. Genitourinary: Negative for difficulty urinating. Musculoskeletal: Positive for arthralgias. Skin: Positive for wound. Negative for rash. Neurological: Negative for dizziness, syncope, weakness, light-headedness and headaches. Psychiatric/Behavioral: Positive for confusion and decreased concentration. Negative for behavioral problems, dysphoric mood and sleep disturbance. The patient is not nervous/anxious. Objective:     Physical Exam  Vitals and nursing note reviewed. Constitutional:       General: She is not in acute distress. Appearance: She is well-developed. Eyes:      Conjunctiva/sclera: Conjunctivae normal.   Neck:      Thyroid: No thyromegaly. Cardiovascular:      Rate and Rhythm: Normal rate and regular rhythm. Heart sounds: Normal heart sounds. No murmur heard. Pulmonary:      Effort: Pulmonary effort is normal. No respiratory distress. Breath sounds: Normal breath sounds. No wheezing.    Abdominal:      General: Abdomen is flat. Bowel sounds are normal.      Palpations: Abdomen is soft. Tenderness: There is no abdominal tenderness. There is no guarding or rebound. Musculoskeletal:      Cervical back: Normal range of motion and neck supple. Right hip: No tenderness or bony tenderness. Decreased range of motion. Decreased strength. Left hip: No tenderness or bony tenderness. Decreased range of motion. Normal strength. Lymphadenopathy:      Cervical: No cervical adenopathy. Skin:     General: Skin is warm and dry. Findings: No erythema or rash. Comments: Wounds covered on legs with dressing. Neurological:      General: No focal deficit present. Mental Status: She is alert. Mental status is at baseline. Psychiatric:         Mood and Affect: Mood normal.         Speech: Speech normal.         Behavior: Behavior normal.         Cognition and Memory: Memory is impaired. Assessment:       Diagnosis Orders   1. Leg swelling     2. Longstanding persistent atrial fibrillation ([facility] [address]     3. Bilateral hip pain               Plan:   1. Leg swelling. Improving. Overall, her legs do not really look swollen to me today they are just large due to obesity. I think the raps have been helping her. I will continue to monitor. 2. Atrial fibrillation. Stable. No recent issues with her heart. Rhythm is normal and she has not felt any palpitations. 3. Bilateral hip pain. New. This seems to only be a problem when she is having her wound care done as she says that the wound care doctor does lift her legs quite high in the air. The pain is not limiting her mobility or impairing her ability to sleep. At this point, I am honestly not going to do anything about it. If it does get worse I will have therapy evaluate her and or try her on a short course of Prednisone. Return in about 1 month (around 7/7/2022) for leg swelling. Patient given educational materials - see patient instructions.   Discussed use, benefit, and side effects of prescribed medications. All patient questions answered. Patient voiced understanding. Reviewed health maintenance. Instructed to continue current medications, diet and exercise. Patient agreed with treatment plan. Follow up as directed. Wood Cadet am personally transcribing for Marquis Arlin MD 6/17/22 at 5:08 PM EDT. Ellie Villagomez MD, personally performed the services described in this document as transcribed by the , and it is both accurate and complete.     Electronically signed by Marquis Arlin MD on 6/20/22 at 8:13 AM EDT

## 2022-07-05 ENCOUNTER — OUTSIDE SERVICES (OUTPATIENT)
Dept: FAMILY MEDICINE CLINIC | Age: 72
End: 2022-07-05

## 2022-07-05 DIAGNOSIS — M79.89 LEG SWELLING: Primary | ICD-10-CM

## 2022-07-05 DIAGNOSIS — L30.9 DERMATITIS: ICD-10-CM

## 2022-07-05 DIAGNOSIS — I48.11 LONGSTANDING PERSISTENT ATRIAL FIBRILLATION (HCC): ICD-10-CM

## 2022-07-05 DIAGNOSIS — J06.9 VIRAL URI: ICD-10-CM

## 2022-08-09 ENCOUNTER — OUTSIDE SERVICES (OUTPATIENT)
Dept: FAMILY MEDICINE CLINIC | Age: 72
End: 2022-08-09

## 2022-08-09 DIAGNOSIS — M79.89 LEG SWELLING: Primary | ICD-10-CM

## 2022-08-09 DIAGNOSIS — I48.11 LONGSTANDING PERSISTENT ATRIAL FIBRILLATION (HCC): ICD-10-CM

## 2022-08-09 DIAGNOSIS — L30.9 DERMATITIS: ICD-10-CM

## 2022-08-09 ASSESSMENT — ENCOUNTER SYMPTOMS
COUGH: 0
SORE THROAT: 1
ABDOMINAL PAIN: 0
CONSTIPATION: 0
SHORTNESS OF BREATH: 0
CHEST TIGHTNESS: 0
WHEEZING: 0
DIARRHEA: 0

## 2022-08-09 NOTE — PROGRESS NOTES
[facility]  [address] [address]  Dept: [phone]  Dept Fax: [phone]  Loc: [phone]    Anjel Singer  is a 81 y.o. female who presents today for her medical conditions/complaints as noted below. Anjel Singer is c/o of     Chief Complaint   Patient presents with    Leg Swelling    Atrial Fibrillation    Rash       HPI:   Ofelia is being seen for her regular monthly follow up while at [facility]. Overall, patient that she has been doing fairly well, she just has a few minor concerns today. Her first concern is that she has a lot of pain in her feet especially at night. She describes it as a burning neuropathy pain that is causing her to wake up crying. She reports that her feet just sort of throb when she is laying down. This is a fairly new problem over the last several weeks to a month or so and not something that she had prior to coming to [facility], but she wonders if it is due to how she is sitting and the problems that she has had with swelling in her legs. The other issue that she has had is that she feels like she has kind of an ongoing cold that she is having trouble shaking. She feels that she always has a sore throat for the last several days and she has kind of a runny nose with some postnasal drip. Patient has been coughing or short of breath. She has not felt feverish. She just has this kind of chronic postnasal drip that has been bothering her. The other thing that has been somewhat bothersome to her is that she has had a rash on her back, but I started her on a steroid cream last week and that seems to be making a significant difference, and she said that is feeling much better. Otherwise, patient is not having any chest pains. No problems with headaches. Appetite is good. No problems with Constipation or diarrhea.     Past Medical History:   Diagnosis Date    Longstanding persistent atrial fibrillation ([facility] [address] 12/7/2021    S/P placement of cardiac pacemaker 12/7/2021     History reviewed. No pertinent surgical history. History reviewed. No pertinent family history. Social History     Tobacco Use    Smoking status: Unknown    Smokeless tobacco: Never   Substance Use Topics    Alcohol use: Not on file      Prior to Visit Medications    Medication Sig Taking? Authorizing Provider   acetaminophen (TYLENOL) 325 MG tablet Take 650 mg by mouth every 4 hours as needed  Historical Provider, MD   apixaban (ELIQUIS) 2.5 MG TABS tablet Take 2.5 mg by mouth 2 times daily  Historical Provider, MD   bumetanide (BUMEX) 2 MG tablet Take 2 mg by mouth daily  Historical Provider, MD   nystatin (MYCOSTATIN) POWD powder Apply topically 2 times daily Apply to affected area topically BID for antifungal  Historical Provider, MD     Allergies   Allergen Reactions    Sulfa Antibiotics        Subjective:      Review of Systems   Constitutional:  Negative for activity change, appetite change, chills, fatigue and fever. HENT:  Positive for congestion, postnasal drip and sore throat. Respiratory:  Negative for cough, chest tightness, shortness of breath and wheezing. Cardiovascular:  Negative for chest pain, palpitations and leg swelling. Gastrointestinal:  Negative for abdominal pain, constipation and diarrhea. Genitourinary:  Negative for difficulty urinating. Musculoskeletal:  Positive for arthralgias (bilateral feet). Skin:  Positive for rash (improving). Neurological:  Negative for dizziness, syncope, weakness, light-headedness and headaches. Psychiatric/Behavioral:  Positive for confusion and decreased concentration. Negative for behavioral problems, dysphoric mood and sleep disturbance. The patient is not nervous/anxious. Objective:     Physical Exam  Vitals and nursing note reviewed. Constitutional:       General: She is not in acute distress.      Appearance: She is materials - see patient instructions. Discussed use, benefit, and side effects of prescribed medications. All patient questions answered. Patient voiced understanding. Reviewed health maintenance. Instructed to continue current medications, diet and exercise. Patient agreed with treatment plan. Follow up as directed. Shruthi Cunningham am personally transcribing for Abdulaziz Nassar MD 8/9/22 at 12:49 PM EDT. Casandra Howe MD, personally performed the services described in this document as transcribed by the , and it is both accurate and complete.     Electronically signed by Abdulaziz Nassar MD on 8/9/22 at 3:55 PM EDT

## 2022-08-10 ENCOUNTER — TELEPHONE (OUTPATIENT)
Dept: FAMILY MEDICINE CLINIC | Age: 72
End: 2022-08-10

## 2022-08-10 RX ORDER — GABAPENTIN 100 MG/1
100 CAPSULE ORAL 2 TIMES DAILY
COMMUNITY

## 2022-08-10 RX ORDER — DIPHENHYDRAMINE HCL 25 MG
25 TABLET ORAL EVERY 6 HOURS PRN
COMMUNITY

## 2022-08-10 RX ORDER — TRIAMCINOLONE ACETONIDE 1 MG/G
CREAM TOPICAL 2 TIMES DAILY PRN
COMMUNITY

## 2022-08-10 RX ORDER — LOPERAMIDE HYDROCHLORIDE 2 MG/1
2 CAPSULE ORAL
COMMUNITY
End: 2022-09-19

## 2022-09-06 ENCOUNTER — OUTSIDE SERVICES (OUTPATIENT)
Dept: FAMILY MEDICINE CLINIC | Age: 72
End: 2022-09-06

## 2022-09-06 DIAGNOSIS — G62.9 NEUROPATHY: ICD-10-CM

## 2022-09-06 DIAGNOSIS — K59.1 FUNCTIONAL DIARRHEA: ICD-10-CM

## 2022-09-06 DIAGNOSIS — T14.8XXA FRICTION BLISTER: ICD-10-CM

## 2022-09-06 DIAGNOSIS — I48.11 LONGSTANDING PERSISTENT ATRIAL FIBRILLATION (HCC): ICD-10-CM

## 2022-09-06 DIAGNOSIS — M79.89 LEG SWELLING: Primary | ICD-10-CM

## 2022-09-15 ENCOUNTER — TELEPHONE (OUTPATIENT)
Dept: FAMILY MEDICINE CLINIC | Age: 72
End: 2022-09-15

## 2022-09-15 RX ORDER — NIRMATRELVIR AND RITONAVIR 300-100 MG
3 KIT ORAL EVERY 12 HOURS
COMMUNITY

## 2022-09-15 RX ORDER — GUAIFENESIN/DEXTROMETHORPHAN 100-10MG/5
10 SYRUP ORAL 4 TIMES DAILY PRN
COMMUNITY

## 2022-09-15 RX ORDER — LOPERAMIDE HYDROCHLORIDE 2 MG/1
2 TABLET ORAL 4 TIMES DAILY PRN
COMMUNITY

## 2022-09-27 ASSESSMENT — ENCOUNTER SYMPTOMS
COUGH: 0
WHEEZING: 0
SHORTNESS OF BREATH: 0
CONSTIPATION: 0
DIARRHEA: 0
CHEST TIGHTNESS: 0
ABDOMINAL PAIN: 0

## 2022-09-27 NOTE — PROGRESS NOTES
[address]  [address] [address]  Dept: [phone]  Dept Fax: [phone]  Loc: [phone]    Payal Payne  is a 81 y.o. female who presents today for her medical conditions/complaints as noted below. Payal Payne is c/o of     Chief Complaint   Patient presents with    Leg Swelling    Atrial Fibrillation    Dermatitis       HPI:   Hemanth Velasco is being seen for her regular monthly follow up while at [facility]. Overall, she has been doing pretty well. The only complaint that she has today is that her legs have been seeping a little bit overnight. She has a couple open wounds on her legs, and they have been draining today. Her legs were not wrapped last night so they are a little bit more swollen today than they have been in the past, but overall she has not noticed any new increased redness or pain in the legs. No fevers. She has not felt chills or feverish either. Patient otherwise has been feeling pretty good. No issues with chest pains or shortness of breath. No abdominal pain, constipation, or diarrhea. Her appetite has been good. She continues to participate in quite a few different activities, and she continues to enjoy her time at [facility]. Past Medical History:   Diagnosis Date    Longstanding persistent atrial fibrillation ([address] 12/7/2021    S/P placement of cardiac pacemaker 12/7/2021     History reviewed. No pertinent surgical history. History reviewed. No pertinent family history. Social History     Tobacco Use    Smoking status: Unknown    Smokeless tobacco: Never   Substance Use Topics    Alcohol use: Not on file      Prior to Visit Medications    Medication Sig Taking?  Authorizing Provider   guaiFENesin-dextromethorphan (ROBITUSSIN DM) 100-10 MG/5ML syrup Take 10 mLs by mouth 4 times daily as needed for Cough  Historical Provider, MD   loperamide (IMODIUM A-D) 2 MG tablet Take 2 mg by mouth 4 times daily as needed for Diarrhea  Historical Provider, MD   nirmatrelvir/ritonavir (PAXLOVID, 300/100,) 20 x 150 MG & 10 x 100MG TBPK Take 3 tablets by mouth every 12 hours Take 3 tablets (two 150 mg nirmatrelvir and one 100 mg ritonavir tablets) by mouth TID for 5 days. Historical Provider, MD   diphenhydrAMINE (BENADRYL) 25 MG tablet Take 25 mg by mouth every 6 hours as needed for Itching  Historical Provider, MD   gabapentin (NEURONTIN) 100 MG capsule Take 100 mg by mouth in the morning and at bedtime. Indications: Pain  Historical Provider, MD   triamcinolone (KENALOG) 0.1 % cream Apply topically 2 times daily as needed (itching)  Historical Provider, MD   acetaminophen (TYLENOL) 325 MG tablet Take 650 mg by mouth every 4 hours as needed  Historical Provider, MD   apixaban (ELIQUIS) 2.5 MG TABS tablet Take 2.5 mg by mouth 2 times daily  Historical Provider, MD   bumetanide (BUMEX) 2 MG tablet Take 2 mg by mouth daily  Historical Provider, MD   nystatin (MYCOSTATIN) POWD powder Apply topically 2 times daily Apply to affected area topically BID for antifungal  Historical Provider, MD     Allergies   Allergen Reactions    Penicillins Rash    Sulfa Antibiotics        Subjective:      Review of Systems   Constitutional:  Negative for activity change, appetite change, chills, fatigue and fever. Respiratory:  Negative for cough, chest tightness, shortness of breath and wheezing. Cardiovascular:  Positive for leg swelling. Negative for chest pain and palpitations. Gastrointestinal:  Negative for abdominal pain, constipation and diarrhea. Genitourinary:  Negative for difficulty urinating. Skin:  Positive for wound. Negative for rash. Neurological:  Negative for dizziness, syncope, weakness, light-headedness and headaches. Psychiatric/Behavioral:  Positive for confusion and decreased concentration. Negative for behavioral problems, dysphoric mood and sleep disturbance.  The patient is not nervous/anxious. Objective:     Physical Exam  Vitals and nursing note reviewed. Constitutional:       General: She is not in acute distress. Appearance: She is well-developed. Eyes:      Conjunctiva/sclera: Conjunctivae normal.   Neck:      Thyroid: No thyromegaly. Cardiovascular:      Rate and Rhythm: Normal rate and regular rhythm. Heart sounds: Normal heart sounds. No murmur heard. Pulmonary:      Effort: Pulmonary effort is normal. No respiratory distress. Breath sounds: Normal breath sounds. No wheezing. Abdominal:      General: Abdomen is flat. Bowel sounds are normal.      Palpations: Abdomen is soft. Tenderness: There is no abdominal tenderness. There is no guarding or rebound. Musculoskeletal:      Cervical back: Normal range of motion and neck supple. Right lower le+ Pitting Edema present. Left lower le+ Pitting Edema present. Lymphadenopathy:      Cervical: No cervical adenopathy. Skin:     General: Skin is warm and dry. Findings: No erythema or rash. Comments: Multiple open source on shins bilaterally. Some sores are draining a clear serous fluid. No signs of infection. Open areas appear to be more of an abrasion with skin shearing. No surrounding erythema. No warmth. Legs are not more tender than normal.    Neurological:      General: No focal deficit present. Mental Status: She is alert. Mental status is at baseline. Psychiatric:         Mood and Affect: Mood normal.         Speech: Speech normal.         Behavior: Behavior normal.         Cognition and Memory: Memory is impaired. Assessment:       Diagnosis Orders   1. Leg swelling        2. Longstanding persistent atrial fibrillation ([address]        3. Dermatitis                  Plan:   Leg swelling. Worsening.  Her legs did not get wrapped yesterday so they are a little bit more swelling then they have been, but I did ask her nurse if she could wrap them for her and just let her know that I am not worried about signs of infection from the drainage from her legs it just looks like an excess of fluid. Atrial fibrillation. Stable. No recent issues with rapid heart rate or chest pain. I will continue to monitor. Dermatitis. Resolved. She did have a pretty bad rash on her back last month, but that is completely gone at this point. Return in about 1 month (around 9/9/2022) for leg swelling. Patient given educational materials - see patient instructions. Discussed use, benefit, and side effects of prescribed medications. All patient questions answered. Patient voiced understanding. Reviewed health maintenance. Instructed to continue current medications, diet and exercise. Patient agreed with treatment plan. Follow up as directed. Yesi Guzman am personally transcribing for Frankey Delaware, MD 9/27/22 at 9:28 AM EDT. Juice Jalloh MD, personally performed the services described in this document as transcribed by the , and it is both accurate and complete.     Electronically signed by Frankey Delaware, MD on 9/27/22 at 12:54 PM EDT

## 2022-10-04 ENCOUNTER — OUTSIDE SERVICES (OUTPATIENT)
Dept: FAMILY MEDICINE CLINIC | Age: 72
End: 2022-10-04

## 2022-10-04 DIAGNOSIS — M79.89 LEG SWELLING: Primary | ICD-10-CM

## 2022-10-04 DIAGNOSIS — U09.9 POST-COVID CHRONIC COUGH: ICD-10-CM

## 2022-10-04 DIAGNOSIS — I48.11 LONGSTANDING PERSISTENT ATRIAL FIBRILLATION (HCC): ICD-10-CM

## 2022-10-04 DIAGNOSIS — R05.3 POST-COVID CHRONIC COUGH: ICD-10-CM

## 2022-10-05 ASSESSMENT — ENCOUNTER SYMPTOMS
DIARRHEA: 0
ABDOMINAL PAIN: 0
CHEST TIGHTNESS: 0
COUGH: 0
SHORTNESS OF BREATH: 0
CONSTIPATION: 0
WHEEZING: 0

## 2022-10-05 NOTE — PROGRESS NOTES
[address]  [address] [address]  Dept: [phone]  Dept Fax: [phone]  Loc: [phone]    Margaux Bloom  is a 81 y.o. female who presents today for her medical conditions/complaints as noted below. Margaux Bloom is c/o of     Chief Complaint   Patient presents with    Leg Swelling    Atrial Fibrillation       HPI:   Moise Busby is being seen for her regular monthly follow up while at [facility]. Overall, patient has been doing fairly well. She just had a few complaints today. Her biggest concern is that her feet have been hurting her more recently. She said that her feet hurt especially when she is walking it is a severe aching pain, but they also hurt when she is laying down to rest or sitting in her chair. Patient said that the pain is bad enough that when she is walking, she occasionally has to stop and rest to let the pain subside before she can continue. Patient has not noticed any increased redness or swelling, and her feet and she has not had any injury to her feet that would explain why she is having pain. Patient denied any problems with chest pains or shortness of breath. The leg swelling is the same, no increase in swelling on one leg more than the other. Patient has not had any problems with headaches. She does have a spot on her back that has a blister and is sore, but it looks like it was a shearing injury she suspects her pillow got stuck to her back in the night because she did not have pillowcases on her pillows at one point and they are plastic, so she suspects that stuck to her and then caused this shearing blister. She said that the area is extremely itchy as it is healing, and she is having a lot of trouble keeping her hands off of it. She was hoping that we could put some sort of bandage over it so that she does not accidentally scratch it.  The other concern that she had was that she has been frequently getting diarrhea when she has peas or biscuits and gravy. Patient reports that this is not new and that it was happening at home as well but when this happened at home, she did take Imodium, which did make a big difference. Patient does not currently have Imodium ordered as needed so she does not have it available if she does need it and she was hoping that I could put that on her list for her. I had asked her if gabapentin that she takes at bedtime makes her sleepy and she was not sure if it did or not because she said that she is frequently sleepy and today she slept in till 7, but when I suggested potentially trying gabapentin in the morning in addition to at night to see if that helped at all with her pain she was extremely open to this idea so I told her that we can certainly try it and if she notices that she is too tired then we can always change it back to just once a day. Past Medical History:   Diagnosis Date    Longstanding persistent atrial fibrillation ([facility] [address] 12/7/2021    S/P placement of cardiac pacemaker 12/7/2021     History reviewed. No pertinent surgical history. History reviewed. No pertinent family history. Social History     Tobacco Use    Smoking status: Unknown    Smokeless tobacco: Never   Substance Use Topics    Alcohol use: Not on file      Prior to Visit Medications    Medication Sig Taking? Authorizing Provider   guaiFENesin-dextromethorphan (ROBITUSSIN DM) 100-10 MG/5ML syrup Take 10 mLs by mouth 4 times daily as needed for Cough  Historical Provider, MD   loperamide (IMODIUM A-D) 2 MG tablet Take 2 mg by mouth 4 times daily as needed for Diarrhea  Historical Provider, MD   nirmatrelvir/ritonavir (PAXLOVID, 300/100,) 20 x 150 MG & 10 x 100MG TBPK Take 3 tablets by mouth every 12 hours Take 3 tablets (two 150 mg nirmatrelvir and one 100 mg ritonavir tablets) by mouth TID for 5 days.   Historical Provider, MD   diphenhydrAMINE (BENADRYL) 25 MG tablet Take 25 mg by mouth every 6 hours as needed for Itching  Historical Provider, MD   gabapentin (NEURONTIN) 100 MG capsule Take 100 mg by mouth in the morning and at bedtime. Indications: Pain  Historical Provider, MD   triamcinolone (KENALOG) 0.1 % cream Apply topically 2 times daily as needed (itching)  Historical Provider, MD   acetaminophen (TYLENOL) 325 MG tablet Take 650 mg by mouth every 4 hours as needed  Historical Provider, MD   apixaban (ELIQUIS) 2.5 MG TABS tablet Take 2.5 mg by mouth 2 times daily  Historical Provider, MD   bumetanide (BUMEX) 2 MG tablet Take 2 mg by mouth daily  Historical Provider, MD   nystatin (MYCOSTATIN) POWD powder Apply topically 2 times daily Apply to affected area topically BID for antifungal  Historical Provider, MD     Allergies   Allergen Reactions    Penicillins Rash    Sulfa Antibiotics        Subjective:      Review of Systems   Constitutional:  Negative for activity change, appetite change, chills, fatigue and fever. Respiratory:  Negative for cough, chest tightness, shortness of breath and wheezing. Cardiovascular:  Positive for leg swelling. Negative for chest pain and palpitations. Gastrointestinal:  Negative for abdominal pain, constipation and diarrhea. Genitourinary:  Negative for difficulty urinating. Musculoskeletal:  Positive for arthralgias (bilateral feet). Skin:  Positive for wound (friction blister). Negative for rash. Neurological:  Negative for dizziness, syncope, weakness, light-headedness and headaches. Psychiatric/Behavioral:  Positive for confusion and decreased concentration. Negative for behavioral problems, dysphoric mood and sleep disturbance. The patient is not nervous/anxious. Objective:     Physical Exam  Vitals and nursing note reviewed. Constitutional:       General: She is not in acute distress. Appearance: She is well-developed. Eyes:      Conjunctiva/sclera: Conjunctivae normal.   Neck:      Thyroid: No thyromegaly. Cardiovascular:      Rate and Rhythm: Normal rate and regular rhythm. Heart sounds: Normal heart sounds. No murmur heard. Pulmonary:      Effort: Pulmonary effort is normal. No respiratory distress. Breath sounds: Normal breath sounds. No wheezing. Abdominal:      General: Abdomen is flat. Bowel sounds are normal.      Palpations: Abdomen is soft. Tenderness: There is no abdominal tenderness. There is no guarding or rebound. Musculoskeletal:      Cervical back: Normal range of motion and neck supple. Right lower leg: Edema present. Left lower leg: Edema present. Lymphadenopathy:      Cervical: No cervical adenopathy. Skin:     General: Skin is warm and dry. Findings: No erythema or rash. Comments: Quarter sized friction blister on left shoulder blade. Neurological:      General: No focal deficit present. Mental Status: She is alert. Mental status is at baseline. Psychiatric:         Mood and Affect: Mood normal.         Speech: Speech normal.         Behavior: Behavior normal.         Cognition and Memory: Memory is impaired. Assessment:       Diagnosis Orders   1. Leg swelling        2. Longstanding persistent atrial fibrillation ([facility] [address]        3. Neuropathy        4. Friction blister        5. Functional diarrhea                  Plan:   Leg swelling. Stable. No recent issues. She continues to get her legs wrapped regularly, which has really been helping prevent open wounds on her legs, and she has not had any recent issues with cellulitis because of this. Atrial fibrillation. Stable. No recent issues with palpitations or chest pains. I will continue to monitor. Neuropathy. New. Patient is having a lot of pain in her feet that does not seem to be improving. I will try increasing her gabapentin to 100mg BID, to see if that helps at all without causing too much sedation. Friction blister. New. Likely due to getting caught on her pillow at night.  I will cover with a dry dressing until healed. Intermittent diarrhea. Stable. This has been an ongoing problem for her for years. I did put Imodium on her list to use as needed if she does develop diarrhea. Return in about 1 month (around 10/6/2022) for leg swelling. Patient given educational materials - see patient instructions. Discussed use, benefit, and side effects of prescribed medications. All patient questions answered. Patient voiced understanding. Reviewed health maintenance. Instructed to continue current medications, diet and exercise. Patient agreed with treatment plan. Follow up as directed. Chacha Siddiqui am personally transcribing for Anahy Kelley MD 10/5/22 at 1:58 PM EDT. Magdalena Michelle MD, personally performed the services described in this document as transcribed by the , and it is both accurate and complete.     Electronically signed by Anahy Kelley MD on 10/6/22 at 10:33 AM EDT

## 2022-11-08 ENCOUNTER — OUTSIDE SERVICES (OUTPATIENT)
Dept: FAMILY MEDICINE CLINIC | Age: 72
End: 2022-11-08

## 2022-11-08 DIAGNOSIS — M79.89 LEG SWELLING: Primary | ICD-10-CM

## 2022-11-08 DIAGNOSIS — U07.1 COVID-19: ICD-10-CM

## 2022-11-08 DIAGNOSIS — G62.9 NEUROPATHY: ICD-10-CM

## 2022-11-08 DIAGNOSIS — L30.9 DERMATITIS: ICD-10-CM

## 2022-11-08 PROBLEM — U09.9 POST-COVID CHRONIC COUGH: Status: ACTIVE | Noted: 2022-11-08

## 2022-11-08 PROBLEM — R05.3 POST-COVID CHRONIC COUGH: Status: ACTIVE | Noted: 2022-11-08

## 2022-11-08 ASSESSMENT — ENCOUNTER SYMPTOMS
COUGH: 1
ABDOMINAL PAIN: 0
CHEST TIGHTNESS: 0
CONSTIPATION: 0
SHORTNESS OF BREATH: 0
WHEEZING: 0
DIARRHEA: 0

## 2022-11-08 NOTE — PROGRESS NOTES
[address]  [address] [address]  Dept: [phone]  Dept Fax: [phone]  Loc: [phone]    Diane Ricardo  is a 81 y.o. female who presents today for her medical conditions/complaints as noted below. Diane Ricardo is c/o of     Chief Complaint   Patient presents with    Atrial Fibrillation    Leg Swelling       HPI:   Edgar Draper is being seen for her regular monthly follow up while at [facility]. Overall, she has been doing pretty well. Patient did have COVID about a month ago, just shortly after my last visit with her. She has recovered nearly completely. She does still have a chronic cough and some shortness of breath that is bothering her. Patient said that the cough is very mildly productive and mostly just a dry irritating cough. She did have significant sinus congestion with a sore throat and ear pressure last week, which I had treated with antibiotics for a sinus infection. Since taking the antibiotics her sinuses have cleared up but her cough continues. She is wondering if there is anything that can be done about that. Her leg swelling overall has been doing pretty well. She has not had any significant concerns with that and she continues to have her legs wrapped regularly which is still helping. Patient does have a new open spot on her left lower leg that she noticed this morning and she notified the nurse of so they will start dressings for that today. Otherwise, she has not had any problems with abdominal pain, constipation, or diarrhea. No chest pains. Patient is not having any problems with regular headaches. She is still participating in activities on a regular basis. No signs of confusion or behavior changes. Past Medical History:   Diagnosis Date    Longstanding persistent atrial fibrillation ([address] 12/7/2021    S/P placement of cardiac pacemaker 12/7/2021     History reviewed.  No pertinent surgical history. History reviewed. No pertinent family history. Social History     Tobacco Use    Smoking status: Unknown    Smokeless tobacco: Never   Substance Use Topics    Alcohol use: Not on file      Prior to Visit Medications    Medication Sig Taking? Authorizing Provider   guaiFENesin-dextromethorphan (ROBITUSSIN DM) 100-10 MG/5ML syrup Take 10 mLs by mouth 4 times daily as needed for Cough  Historical Provider, MD   loperamide (IMODIUM A-D) 2 MG tablet Take 2 mg by mouth 4 times daily as needed for Diarrhea  Historical Provider, MD   nirmatrelvir/ritonavir (PAXLOVID, 300/100,) 20 x 150 MG & 10 x 100MG TBPK Take 3 tablets by mouth every 12 hours Take 3 tablets (two 150 mg nirmatrelvir and one 100 mg ritonavir tablets) by mouth TID for 5 days. Historical Provider, MD   diphenhydrAMINE (BENADRYL) 25 MG tablet Take 25 mg by mouth every 6 hours as needed for Itching  Historical Provider, MD   gabapentin (NEURONTIN) 100 MG capsule Take 100 mg by mouth in the morning and at bedtime. Indications: Pain  Historical Provider, MD   triamcinolone (KENALOG) 0.1 % cream Apply topically 2 times daily as needed (itching)  Historical Provider, MD   acetaminophen (TYLENOL) 325 MG tablet Take 650 mg by mouth every 4 hours as needed  Historical Provider, MD   apixaban (ELIQUIS) 2.5 MG TABS tablet Take 2.5 mg by mouth 2 times daily  Historical Provider, MD   bumetanide (BUMEX) 2 MG tablet Take 2 mg by mouth daily  Historical Provider, MD   nystatin (MYCOSTATIN) POWD powder Apply topically 2 times daily Apply to affected area topically BID for antifungal  Historical Provider, MD     Allergies   Allergen Reactions    Penicillins Rash    Sulfa Antibiotics        Subjective:      Review of Systems   Constitutional:  Negative for activity change, appetite change, chills, fatigue and fever. Respiratory:  Positive for cough. Negative for chest tightness, shortness of breath and wheezing.     Cardiovascular:  Negative for chest pain, palpitations and leg swelling. Gastrointestinal:  Negative for abdominal pain, constipation and diarrhea. Genitourinary:  Negative for difficulty urinating. Skin:  Negative for rash. Neurological:  Negative for dizziness, syncope, weakness, light-headedness and headaches. Psychiatric/Behavioral:  Positive for confusion and decreased concentration. Negative for behavioral problems, dysphoric mood and sleep disturbance. The patient is not nervous/anxious. Objective:     Physical Exam  Vitals and nursing note reviewed. Constitutional:       General: She is not in acute distress. Appearance: She is well-developed. Eyes:      Conjunctiva/sclera: Conjunctivae normal.   Neck:      Thyroid: No thyromegaly. Cardiovascular:      Rate and Rhythm: Normal rate and regular rhythm. Heart sounds: Normal heart sounds. No murmur heard. Pulmonary:      Effort: Pulmonary effort is normal. No respiratory distress. Breath sounds: Normal breath sounds. No wheezing. Abdominal:      General: Abdomen is flat. Bowel sounds are normal.      Palpations: Abdomen is soft. Tenderness: There is no abdominal tenderness. There is no guarding or rebound. Musculoskeletal:      Cervical back: Normal range of motion and neck supple. Lymphadenopathy:      Cervical: No cervical adenopathy. Skin:     General: Skin is warm and dry. Findings: No erythema or rash. Comments: Small dime sized open area on left lower leg just distal to the knee. Area is slightly open with a scab around the edge. Neurological:      General: No focal deficit present. Mental Status: She is alert. Mental status is at baseline. Psychiatric:         Mood and Affect: Mood normal.         Speech: Speech normal.         Behavior: Behavior normal.         Cognition and Memory: Memory is impaired. Assessment:       Diagnosis Orders   1. Leg swelling        2.  Longstanding persistent atrial fibrillation ([address]        3. Post-COVID chronic cough                  Plan:   Leg swelling. Stable. No recent issues. Patient continues to get her legs wrapped. There is a new small sore but it is not draining and does not seem to be due to excessive swelling. Nursing staff will start putting a dressing on it and she will continue to have her legs wrapped. Atrial fibrillation. Stable. No recent issues with her heart. No palpitations. Overall, she has been doing very well therefore I will just continue to monitor. Post COVID cough. New. Patient has continued to have this cough that is becoming somewhat bothersome to her so I will start her on Symbicort 160/4.5 one puff twice a day for one month to see if that improves her overall cough and breathing capacity. Return in about 1 month (around 11/4/2022) for leg swelling. Patient given educational materials - see patient instructions. Discussed use, benefit, and side effects of prescribed medications. All patient questions answered. Patient voiced understanding. Reviewed health maintenance. Instructed to continue current medications, diet and exercise. Patient agreed with treatment plan. Follow up as directed. Power Bynum am personally transcribing for Faina Murdock MD 11/8/22 at 12:35 PM EST. Luis Manuel Jaquez MD, personally performed the services described in this document as transcribed by the , and it is both accurate and complete.     Electronically signed by Faina Murdock MD on 11/8/22 at 4:43 PM EST

## 2022-11-11 ASSESSMENT — PATIENT HEALTH QUESTIONNAIRE - PHQ9
SUM OF ALL RESPONSES TO PHQ9 QUESTIONS 1 & 2: 0
2. FEELING DOWN, DEPRESSED OR HOPELESS: 0
SUM OF ALL RESPONSES TO PHQ QUESTIONS 1-9: 0
SUM OF ALL RESPONSES TO PHQ QUESTIONS 1-9: 0
1. LITTLE INTEREST OR PLEASURE IN DOING THINGS: 0
SUM OF ALL RESPONSES TO PHQ QUESTIONS 1-9: 0
SUM OF ALL RESPONSES TO PHQ QUESTIONS 1-9: 0

## 2022-12-13 ENCOUNTER — OUTSIDE SERVICES (OUTPATIENT)
Dept: FAMILY MEDICINE CLINIC | Age: 72
End: 2022-12-13

## 2022-12-13 DIAGNOSIS — G62.9 NEUROPATHY: ICD-10-CM

## 2022-12-13 DIAGNOSIS — L30.9 ACUTE DERMATITIS: ICD-10-CM

## 2022-12-13 DIAGNOSIS — I48.11 LONGSTANDING PERSISTENT ATRIAL FIBRILLATION (HCC): ICD-10-CM

## 2022-12-13 DIAGNOSIS — M79.89 LEG SWELLING: Primary | ICD-10-CM

## 2022-12-19 ASSESSMENT — ENCOUNTER SYMPTOMS
DIARRHEA: 0
WHEEZING: 0
COUGH: 0
CHEST TIGHTNESS: 0
SHORTNESS OF BREATH: 0
ABDOMINAL PAIN: 0
CONSTIPATION: 0

## 2022-12-19 NOTE — PROGRESS NOTES
[address]  [address] [address]  Dept: [phone]  Dept Fax: [phone]  Loc: [phone]    Concetta Cruz  is a 81 y.o. female who presents today for her medical conditions/complaints as noted below. Concetta Cruz is c/o of     Chief Complaint   Patient presents with    Peripheral Neuropathy    Leg Swelling    Positive For Covid-19       HPI:   José Miguel Sheth is being seen for her regular monthly follow up while at [facility]. Overall, patient has been doing very well. Her biggest complaint is that she has a lot of pain in her legs at night that has been getting progressively worse. Patient does not think that the gabapentin seems to be doing enough for her. She was having a little bit of trouble with fatigue in the last few weeks, but she has been exposed to COVID several times and she does have a cough so although she has been testing negative I do suspect that she may have had COVID. Today when I saw her cough was nearly gone and her fatigue has almost completely resolved as well. Patient also has a rash on her left upper arm which she reports she has been scratching at despite the nurse telling her to stop so she would like some cream to try to help with that. Past Medical History:   Diagnosis Date    Longstanding persistent atrial fibrillation ([address] 12/7/2021    S/P placement of cardiac pacemaker 12/7/2021     History reviewed. No pertinent surgical history. History reviewed. No pertinent family history. Social History     Tobacco Use    Smoking status: Unknown    Smokeless tobacco: Never   Substance Use Topics    Alcohol use: Not on file      Prior to Visit Medications    Medication Sig Taking?  Authorizing Provider   guaiFENesin-dextromethorphan (ROBITUSSIN DM) 100-10 MG/5ML syrup Take 10 mLs by mouth 4 times daily as needed for Cough  Historical Provider, MD   loperamide (IMODIUM A-D) 2 MG tablet Take 2 mg by mouth 4 times daily as needed for Diarrhea  Historical Provider, MD   nirmatrelvir/ritonavir (PAXLOVID, 300/100,) 20 x 150 MG & 10 x 100MG TBPK Take 3 tablets by mouth every 12 hours Take 3 tablets (two 150 mg nirmatrelvir and one 100 mg ritonavir tablets) by mouth TID for 5 days. Historical Provider, MD   diphenhydrAMINE (BENADRYL) 25 MG tablet Take 25 mg by mouth every 6 hours as needed for Itching  Historical Provider, MD   gabapentin (NEURONTIN) 100 MG capsule Take 100 mg by mouth in the morning and at bedtime. Indications: Pain  Historical Provider, MD   triamcinolone (KENALOG) 0.1 % cream Apply topically 2 times daily as needed (itching)  Historical Provider, MD   acetaminophen (TYLENOL) 325 MG tablet Take 650 mg by mouth every 4 hours as needed  Historical Provider, MD   apixaban (ELIQUIS) 2.5 MG TABS tablet Take 2.5 mg by mouth 2 times daily  Historical Provider, MD   bumetanide (BUMEX) 2 MG tablet Take 2 mg by mouth daily  Historical Provider, MD   nystatin (MYCOSTATIN) POWD powder Apply topically 2 times daily Apply to affected area topically BID for antifungal  Historical Provider, MD     Allergies   Allergen Reactions    Penicillins Rash    Sulfa Antibiotics        Subjective:      Review of Systems   Constitutional:  Negative for activity change, appetite change, chills, fatigue and fever. Respiratory:  Negative for cough, chest tightness, shortness of breath and wheezing. Cardiovascular:  Negative for chest pain, palpitations and leg swelling. Gastrointestinal:  Negative for abdominal pain, constipation and diarrhea. Genitourinary:  Negative for difficulty urinating. Musculoskeletal:  Positive for arthralgias (bilateral lower legs). Skin:  Positive for rash. Neurological:  Negative for dizziness, syncope, weakness, light-headedness and headaches. Psychiatric/Behavioral:  Positive for confusion and decreased concentration.  Negative for behavioral problems, dysphoric mood and sleep disturbance. The patient is not nervous/anxious. Objective:     Physical Exam  Vitals and nursing note reviewed. Constitutional:       General: She is not in acute distress. Appearance: She is well-developed. Eyes:      Conjunctiva/sclera: Conjunctivae normal.   Neck:      Thyroid: No thyromegaly. Cardiovascular:      Rate and Rhythm: Normal rate and regular rhythm. Heart sounds: Normal heart sounds. No murmur heard. Pulmonary:      Effort: Pulmonary effort is normal. No respiratory distress. Breath sounds: Normal breath sounds. No wheezing. Abdominal:      General: Abdomen is flat. Bowel sounds are normal.      Palpations: Abdomen is soft. Tenderness: There is no abdominal tenderness. There is no guarding or rebound. Musculoskeletal:      Cervical back: Normal range of motion and neck supple. Lymphadenopathy:      Cervical: No cervical adenopathy. Skin:     General: Skin is warm and dry. Findings: No erythema or rash. Comments: Some excoriated patches on left upper arm. Neurological:      General: No focal deficit present. Mental Status: She is alert. Mental status is at baseline. Psychiatric:         Mood and Affect: Mood normal.         Speech: Speech normal.         Behavior: Behavior normal.         Cognition and Memory: Memory is impaired. Assessment:       Diagnosis Orders   1. Leg swelling        2. Neuropathy        3. COVID-19        4. Dermatitis                  Plan:   Leg swelling. Improving. She has been doing really well to the point that the nurses are no longer wrapping her legs which she is pretty excited about that as that was a bit uncomfortable for her so she is glad to know that she has made some improvements. Neuropathy. Worsening. Overall, she has had a lot more issues with pain in her legs especially at night. I increased her gabapentin to 300mg twice a day to try to help. COVID-19. Improving.  Patient does not think that she had COVID, but I think that she did but either way her symptoms seem to be improving and the cough is much better. I will just continue to monitor. Dermatitis. New. Likely due to a slight itchy spot that she has excoriated. I will get her some triamcinolone cream that should help improve the symptoms. Return in about 1 month (around 12/8/2022) for leg swelling. Patient given educational materials - see patient instructions. Discussed use, benefit, and side effects of prescribed medications. All patient questions answered. Patient voiced understanding. Reviewed health maintenance. Instructed to continue current medications, diet and exercise. Patient agreed with treatment plan. Follow up as directed. Marline Monroy am personally transcribing for Meenakshi Rothman MD 12/19/22 at 2:25 PM EST. Maribel Jade MD, personally performed the services described in this document as transcribed by the , and it is both accurate and complete.     Electronically signed by Meenakshi Rothman MD on 12/19/22 at 5:12 PM EST

## 2022-12-29 ENCOUNTER — OUTSIDE SERVICES (OUTPATIENT)
Dept: FAMILY MEDICINE CLINIC | Age: 72
End: 2022-12-29

## 2022-12-29 DIAGNOSIS — K64.0 GRADE I HEMORRHOIDS: Primary | ICD-10-CM

## 2022-12-29 PROCEDURE — 99308 SBSQ NF CARE LOW MDM 20: CPT | Performed by: FAMILY MEDICINE

## 2022-12-29 ASSESSMENT — ENCOUNTER SYMPTOMS
SORE THROAT: 0
ABDOMINAL PAIN: 0
VOMITING: 0
COUGH: 0
CHANGE IN BOWEL HABIT: 0
NAUSEA: 0

## 2022-12-29 NOTE — PROGRESS NOTES
[address]  [address] [address]  Dept: [phone]  Dept Fax: [phone]  Loc: [phone]    Wendy Silva is a 81 y.o. female who presents today for her medical conditions/complaints as noted below. Wendy Silva is c/o of   Chief Complaint   Patient presents with    Bleeding/Bruising     Unclear if rectal, vaginal or urine       HPI:   Patient was seen for an acute visit today for blood in her brief. Hemorrhoids  This is a new problem. Episode onset: 2 days. The problem occurs intermittently. The problem has been unchanged. Pertinent negatives include no abdominal pain, anorexia, arthralgias, change in bowel habit, congestion, coughing, fatigue, fever, nausea, sore throat, vomiting or weakness. Exacerbated by: sitting. She has tried nothing for the symptoms. The treatment provided no relief. Patient noticed blood in her brief on two separate occasions in the morning. She was not sure if it was coming from the rectum, vagina or urine. She has not pain with urination, no recent bowel changes. She does have a uterus. She is not having any vaginal discharge. She has had this happen before many years ago.      Past Medical History:   Diagnosis Date    Longstanding persistent atrial fibrillation ([facility] [address] 12/7/2021    S/P placement of cardiac pacemaker 12/7/2021          Social History     Tobacco Use    Smoking status: Unknown    Smokeless tobacco: Never   Substance Use Topics    Alcohol use: Not on file     Current Outpatient Medications   Medication Sig Dispense Refill    guaiFENesin-dextromethorphan (ROBITUSSIN DM) 100-10 MG/5ML syrup Take 10 mLs by mouth 4 times daily as needed for Cough      loperamide (IMODIUM A-D) 2 MG tablet Take 2 mg by mouth 4 times daily as needed for Diarrhea      nirmatrelvir/ritonavir (PAXLOVID, 300/100,) 20 x 150 MG & 10 x 100MG TBPK Take 3 tablets by mouth every 12 hours Take 3 tablets (two 150 mg nirmatrelvir and one 100 mg ritonavir tablets) by mouth TID for 5 days. diphenhydrAMINE (BENADRYL) 25 MG tablet Take 25 mg by mouth every 6 hours as needed for Itching      gabapentin (NEURONTIN) 100 MG capsule Take 100 mg by mouth in the morning and at bedtime. Indications: Pain      triamcinolone (KENALOG) 0.1 % cream Apply topically 2 times daily as needed (itching)      acetaminophen (TYLENOL) 325 MG tablet Take 650 mg by mouth every 4 hours as needed      apixaban (ELIQUIS) 2.5 MG TABS tablet Take 2.5 mg by mouth 2 times daily      bumetanide (BUMEX) 2 MG tablet Take 2 mg by mouth daily      nystatin (MYCOSTATIN) POWD powder Apply topically 2 times daily Apply to affected area topically BID for antifungal       No current facility-administered medications for this visit. Allergies   Allergen Reactions    Penicillins Rash    Sulfa Antibiotics        Subjective:     Review of Systems   Constitutional:  Negative for fatigue and fever. HENT:  Negative for congestion and sore throat. Respiratory:  Negative for cough. Gastrointestinal:  Positive for hemorrhoids. Negative for abdominal pain, anorexia, change in bowel habit, nausea and vomiting. Musculoskeletal:  Negative for arthralgias. Neurological:  Negative for weakness. Objective:      Physical Exam  Vitals and nursing note reviewed. Exam conducted with a chaperone present. Constitutional:       General: She is not in acute distress. Appearance: She is well-developed. She is obese. Eyes:      Conjunctiva/sclera: Conjunctivae normal.   Neck:      Thyroid: No thyromegaly. Cardiovascular:      Rate and Rhythm: Normal rate and regular rhythm. Heart sounds: Normal heart sounds. No murmur heard. Pulmonary:      Effort: Pulmonary effort is normal. No respiratory distress. Breath sounds: Normal breath sounds. No wheezing. Genitourinary:     Vagina: No bleeding.       Rectum: External hemorrhoid present. No tenderness or anal fissure. Musculoskeletal:      Cervical back: Normal range of motion and neck supple. Lymphadenopathy:      Cervical: No cervical adenopathy. Skin:     General: Skin is warm and dry. Findings: No erythema or rash. Neurological:      Mental Status: She is alert and oriented to person, place, and time. Assessment:       Diagnosis Orders   1. Grade I hemorrhoids                  Plan:       Hemorrhoids: new; it is not bothering her and I don't think she could easily use tucks pads or hemorrhoid ointment so I will just monitor for now. Nursing staff was reassured. Return if symptoms worsen or fail to improve. Patientgiven educational materials - see patient instructions. Discussed use, benefit,and side effects of prescribed medications. All patient questions answered. Ptvoiced understanding. Reviewed health maintenance. Instructed to continue currentmedications, diet and exercise. Patient agreed with treatment plan. Follow up asdirected.      Electronically signed by Rossy Robertson MD on 12/29/2022 at 5:54 PM

## 2023-01-09 ASSESSMENT — ENCOUNTER SYMPTOMS
CONSTIPATION: 0
COUGH: 0
DIARRHEA: 1
ABDOMINAL PAIN: 0
CHEST TIGHTNESS: 0
SHORTNESS OF BREATH: 0
WHEEZING: 0

## 2023-01-09 NOTE — PROGRESS NOTES
[address]  [address] [address]  Dept: [phone]  Dept Fax: [phone]  Loc: [phone]    Lenore Jenkins  is a 81 y.o. female who presents today for her medical conditions/complaints as noted below. Lenore Jenkins is c/o of     Chief Complaint   Patient presents with    Leg Swelling    Atrial Fibrillation    Peripheral Neuropathy       HPI:   Beau Mcmillan is being seen for her regular monthly follow up while at [facility]. Overall, patient has been doing OK. Although she does have multiple complaints today. She has been continuing to have pain in her legs and the increase in Gabapentin from 100 mg of night to 100mg in the morning and at night has not made a dramatic difference in her pain level. No issues with side effects of Gabapentin though so she is willing for me to increase it further. Another complain is that she is concerned her legs are getting more swollen, because they are not being wrapped anymore at this point. Although, she does not have any large open sores on them right now. She does have two little skin tears which are both covered by a very tiny bandage, but she is just worried that they are getting more swollen. She also reported that she had some diarrhea this morning, but she is not sure what caused that, and she was looking forward to eating lunch, so it was not anything that was too bothersome to her. She also has noticed that she has had a lot of itching of her skin which she suspects is due to the laundry detergent. She said she does not have any problem and then she goes back from the laundry and then her skin itches a whole bunch and then it starts to get better and then she gets her clothes back from the laundry and it is a repeating problem.  She does still have cream which I encouraged her to ask for and have that applied as needed when she gets a spot that extra irritated. She has not had any issues for chest pains and shortness of breath, but she does still feel like she has a cold that just will not let go. She has had some kind of chronic sinus congestion and drainage for the last week 6 weeks. Patient said she is sleeping well. She is still participating in most activities. She is eating well, and she does not really have any other concerns. Past Medical History:   Diagnosis Date    Longstanding persistent atrial fibrillation ([address] 12/7/2021    S/P placement of cardiac pacemaker 12/7/2021     History reviewed. No pertinent surgical history. History reviewed. No pertinent family history. Social History     Tobacco Use    Smoking status: Unknown    Smokeless tobacco: Never   Substance Use Topics    Alcohol use: Not on file      Prior to Visit Medications    Medication Sig Taking? Authorizing Provider   guaiFENesin-dextromethorphan (ROBITUSSIN DM) 100-10 MG/5ML syrup Take 10 mLs by mouth 4 times daily as needed for Cough  Historical Provider, MD   loperamide (IMODIUM A-D) 2 MG tablet Take 2 mg by mouth 4 times daily as needed for Diarrhea  Historical Provider, MD   nirmatrelvir/ritonavir (PAXLOVID, 300/100,) 20 x 150 MG & 10 x 100MG TBPK Take 3 tablets by mouth every 12 hours Take 3 tablets (two 150 mg nirmatrelvir and one 100 mg ritonavir tablets) by mouth TID for 5 days. Historical Provider, MD   diphenhydrAMINE (BENADRYL) 25 MG tablet Take 25 mg by mouth every 6 hours as needed for Itching  Historical Provider, MD   gabapentin (NEURONTIN) 100 MG capsule Take 100 mg by mouth in the morning and at bedtime.  Indications: Pain  Historical Provider, MD   triamcinolone (KENALOG) 0.1 % cream Apply topically 2 times daily as needed (itching)  Historical Provider, MD   acetaminophen (TYLENOL) 325 MG tablet Take 650 mg by mouth every 4 hours as needed  Historical Provider, MD   apixaban (ELIQUIS) 2.5 MG TABS tablet Take 2.5 mg by mouth 2 times daily  Historical Provider, MD   bumetanide (BUMEX) 2 MG tablet Take 2 mg by mouth daily  Historical Provider, MD   nystatin (MYCOSTATIN) POWD powder Apply topically 2 times daily Apply to affected area topically BID for antifungal  Historical Provider, MD     Allergies   Allergen Reactions    Penicillins Rash    Sulfa Antibiotics        Subjective:      Review of Systems   Constitutional:  Negative for activity change, appetite change, chills, fatigue and fever. HENT:  Positive for congestion. Respiratory:  Negative for cough, chest tightness, shortness of breath and wheezing. Cardiovascular:  Positive for leg swelling. Negative for chest pain and palpitations. Gastrointestinal:  Positive for diarrhea. Negative for abdominal pain and constipation. Genitourinary:  Negative for difficulty urinating. Musculoskeletal:  Positive for myalgias. Skin:  Negative for rash. Neurological:  Negative for dizziness, syncope, weakness, light-headedness and headaches. Psychiatric/Behavioral:  Positive for confusion and decreased concentration. Negative for behavioral problems, dysphoric mood and sleep disturbance. The patient is not nervous/anxious. Objective:     Physical Exam  Vitals and nursing note reviewed. Constitutional:       General: She is not in acute distress. Appearance: She is well-developed. Eyes:      Conjunctiva/sclera: Conjunctivae normal.   Neck:      Thyroid: No thyromegaly. Cardiovascular:      Rate and Rhythm: Normal rate and regular rhythm. Heart sounds: Normal heart sounds. No murmur heard. Pulmonary:      Effort: Pulmonary effort is normal. No respiratory distress. Breath sounds: Normal breath sounds. No wheezing. Abdominal:      General: Abdomen is flat. Bowel sounds are normal.      Palpations: Abdomen is soft. Tenderness: There is no abdominal tenderness. There is no guarding or rebound. Musculoskeletal:      Cervical back: Normal range of motion and neck supple. Lymphadenopathy:      Cervical: No cervical adenopathy. Skin:     General: Skin is warm and dry. Findings: No erythema or rash. Comments: Legs are not obviously swollen. Two small skin tears covered by bandages one on each leg. A quarter sized excoriated area on lower back near the midline that is quite raw. Neurological:      General: No focal deficit present. Mental Status: She is alert. Mental status is at baseline. Psychiatric:         Mood and Affect: Mood normal.         Speech: Speech normal.         Behavior: Behavior normal.         Cognition and Memory: Memory is impaired. Assessment:       Diagnosis Orders   1. Leg swelling        2. Neuropathy        3. Longstanding persistent atrial fibrillation ([facility] [address]        4. Acute dermatitis                  Plan:   Leg swelling. Stable. Legs are not really very swollen right now she just has large legs. I told her at this point I still do not think that she needs any wraps and she has been doing well without them. I will continue to monitor. Neuropathy. Worsening. Patient is not having any improvement with the BID dosing of 100mg of gabapentin so I did increase her to 300mg at night and she will still continue the 100mg in the morning. Hopefully this will help without making her too sedated. Atrial fibrillation. Stable. No recent issues with palpitations, chest pains, or shortness of breath. Dermatitis. Worsening. This seems to be due to the laundry detergent as she pointed out. I encouraged her to ask for the triamcinolone cream twice a day so that can be applied until the area has healed. Return in about 1 month (around 1/13/2023) for leg pain. Patient given educational materials - see patient instructions. Discussed use, benefit, and side effects of prescribed medications. All patient questions answered. Patient voiced understanding. Reviewed health maintenance.   Instructed to continue current medications, diet and exercise. Patient agreed with treatment plan. Follow up as directed. Susy Arce am personally transcribing for Sydney Sin MD 1/9/23 at 10:22 AM JUJU Bond MD, personally performed the services described in this document as transcribed by the , and it is both accurate and complete.     Electronically signed by Sydney Sin MD on 1/9/23 at 12:34 PM EST

## 2023-01-10 ENCOUNTER — OUTSIDE SERVICES (OUTPATIENT)
Dept: FAMILY MEDICINE CLINIC | Age: 73
End: 2023-01-10

## 2023-01-10 DIAGNOSIS — G62.9 NEUROPATHY: ICD-10-CM

## 2023-01-10 DIAGNOSIS — S21.209A OPEN WOUND OF BACK, UNSPECIFIED LATERALITY, INITIAL ENCOUNTER: ICD-10-CM

## 2023-01-10 DIAGNOSIS — K64.0 GRADE I HEMORRHOIDS: ICD-10-CM

## 2023-01-10 DIAGNOSIS — L03.115 CELLULITIS OF RIGHT LEG: Primary | ICD-10-CM

## 2023-01-10 DIAGNOSIS — M79.89 LEG SWELLING: ICD-10-CM

## 2023-01-10 PROCEDURE — 99309 SBSQ NF CARE MODERATE MDM 30: CPT | Performed by: FAMILY MEDICINE

## 2023-01-19 ENCOUNTER — TELEPHONE (OUTPATIENT)
Dept: FAMILY MEDICINE CLINIC | Age: 73
End: 2023-01-19

## 2023-01-19 RX ORDER — TRIAMCINOLONE ACETONIDE 1 MG/G
CREAM TOPICAL 2 TIMES DAILY PRN
COMMUNITY
End: 2023-01-19

## 2023-01-19 RX ORDER — LOPERAMIDE HYDROCHLORIDE 2 MG/1
2 CAPSULE ORAL 2 TIMES DAILY PRN
COMMUNITY
End: 2023-01-19

## 2023-01-19 RX ORDER — GUAIFENESIN 600 MG/1
1200 TABLET, EXTENDED RELEASE ORAL 2 TIMES DAILY PRN
COMMUNITY

## 2023-01-19 RX ORDER — GABAPENTIN 300 MG/1
300 CAPSULE ORAL 3 TIMES DAILY
COMMUNITY

## 2023-01-19 RX ORDER — TRIAMCINOLONE ACETONIDE 1 MG/G
CREAM TOPICAL
COMMUNITY

## 2023-01-19 RX ORDER — LOPERAMIDE HYDROCHLORIDE 2 MG/1
2 TABLET ORAL 2 TIMES DAILY PRN
COMMUNITY

## 2023-01-30 ASSESSMENT — ENCOUNTER SYMPTOMS
COUGH: 1
ABDOMINAL PAIN: 0
WHEEZING: 0
CONSTIPATION: 0
CHEST TIGHTNESS: 0
DIARRHEA: 0
SHORTNESS OF BREATH: 0

## 2023-01-30 NOTE — PROGRESS NOTES
Curtis Winn is a 8year old female who was brought in for this visit. History was provided by the caregiver. HPI:   Patient presents with:   Well Child      Diet: fruits, few veggies, chicken, meat, dairy, some water, little juice, soda daily  Constipation [address]  [address] [facility] [address]  Dept: [phone]  Dept Fax: [phone]  Loc: [phone]    Jennifer Israel  is a 81 y.o. female who presents today for her medical conditions/complaints as noted below. Jennifer Israel is c/o of     Chief Complaint   Patient presents with    Peripheral Neuropathy    Rash    Rectal Bleeding       HPI:   Mayur Romero is being seen for her regular monthly follow up while at [facility]. Overall, patient has been having some general health challenges over the last month or so. I ended up seeing her for an acute visit two weeks ago because she was having some rectal bleeding. That seems to have resolved and she has not had any more concerns about that. although now she has a pretty significant wound on her right leg and a very deep wound on her back. The area on her back previously had an erythematous spot that she felt had rubbed against her pillow while she was sleeping. Her leg she said got itchy and so she started scratching at it and then it became very irritated, erythematous, and very painful. She said that she has not had any fevers and she has not felt ill, but she is having a lot of discomfort from both areas. Currently, she is not having any other major issues. Patient does have a cold that she has developed, and she has a pretty significant cough that is productive of a little bit of phlegm. She said that she has a history of smoking many years ago but she only smoked for about 10 years. The cough just really started getting bad about two days ago. She says she has not had any fevers. She is not feeling short of breath. She just has a cough and a little bit of sinus congestion. Patient says that she has been taking Mucinex which seems to be helping. Otherwise, her appetite has been good. No problems with constipation or diarrhea.  Patient still is enjoying membranes are moist, no oral lesions are noted  Neck/Thyroid: neck is supple without adenopathy  Respiratory: normal to inspection, lungs are clear to auscultation bilaterally, normal respiratory effort  Cardiovascular: regular rate and rhythm, no murmurs, participating in activities and does not seem to have any issues with feeling anxious or depressed. Past Medical History:   Diagnosis Date    Longstanding persistent atrial fibrillation ([facility] [address] 12/7/2021    S/P placement of cardiac pacemaker 12/7/2021     History reviewed. No pertinent surgical history. History reviewed. No pertinent family history. Social History     Tobacco Use    Smoking status: Unknown    Smokeless tobacco: Never   Substance Use Topics    Alcohol use: Not on file      Prior to Visit Medications    Medication Sig Taking? Authorizing Provider   miconazole (MICOTIN) 2 % powder Apply topically 2 times daily Apply to ABD folds & Bilat breasts topically every shift for redness  Historical Provider, MD   gabapentin (NEURONTIN) 300 MG capsule Take 300 mg by mouth 3 times daily.   Historical Provider, MD   guaiFENesin (MUCINEX) 600 MG extended release tablet Take 1,200 mg by mouth 2 times daily as needed for Congestion  Historical Provider, MD   loperamide (IMODIUM A-D) 2 MG tablet Take 2 mg by mouth 2 times daily as needed for Diarrhea  Historical Provider, MD   triamcinolone (KENALOG) 0.1 % cream Apply topically Apply to arm topically every shift for rash  Historical Provider, MD vogelFENesin-dextromethorphan (ROBITUSSIN DM) 100-10 MG/5ML syrup Take 10 mLs by mouth 4 times daily as needed for Cough  Historical Provider, MD   diphenhydrAMINE (BENADRYL) 25 MG tablet Take 25 mg by mouth every 6 hours as needed for Itching  Historical Provider, MD   acetaminophen (TYLENOL) 325 MG tablet Take 650 mg by mouth every 4 hours as needed  Historical Provider, MD   apixaban (ELIQUIS) 2.5 MG TABS tablet Take 2.5 mg by mouth 2 times daily  Historical Provider, MD   bumetanide (BUMEX) 2 MG tablet Take 2 mg by mouth daily  Historical Provider, MD     Allergies   Allergen Reactions    Penicillins Rash    Sulfa Antibiotics        Subjective:      Review of Systems   Constitutional:  Negative for activity change, appetite change, chills, fatigue and fever.   HENT:  Positive for congestion.    Respiratory:  Positive for cough. Negative for chest tightness, shortness of breath and wheezing.    Cardiovascular:  Negative for chest pain, palpitations and leg swelling.   Gastrointestinal:  Negative for abdominal pain, constipation and diarrhea.   Genitourinary:  Negative for difficulty urinating.   Skin:  Positive for wound (Excoriated wound on right lower extremity and a deep wound on the mid back.). Negative for rash.   Neurological:  Negative for dizziness, syncope, weakness, light-headedness and headaches.   Psychiatric/Behavioral:  Positive for confusion and decreased concentration. Negative for behavioral problems, dysphoric mood and sleep disturbance. The patient is not nervous/anxious.      Objective:     Physical Exam  Vitals and nursing note reviewed.   Constitutional:       General: She is not in acute distress.     Appearance: She is well-developed.   Eyes:      Conjunctiva/sclera: Conjunctivae normal.   Neck:      Thyroid: No thyromegaly.   Cardiovascular:      Rate and Rhythm: Normal rate and regular rhythm.      Heart sounds: Normal heart sounds. No murmur heard.  Pulmonary:      Effort: Pulmonary effort is normal. No respiratory distress.      Breath sounds: Normal breath sounds. No wheezing.   Abdominal:      General: Abdomen is flat. Bowel sounds are normal.      Palpations: Abdomen is soft.      Tenderness: There is no abdominal tenderness. There is no guarding or rebound.   Musculoskeletal:      Cervical back: Normal range of motion and neck supple.   Lymphadenopathy:      Cervical: No cervical adenopathy.   Skin:     General: Skin is warm and dry.      Findings: No erythema or rash.      Comments: 1. Right lower leg has a significant excoriated wound from ankle to mid shin covering most of her shin. Wound has some open areas that are weeping a clear fluid and the rest of the area is extremely erythematous and  very tender to touch. 2. Mid back deep wound. Almost appears that the fat layer is present. Wound is about 6cm long and 1cm wide. No erythematous surrounding it. No exudate from the wound. No signs of induration or fluctuation around the wound. Wound is just rather deep. Neurological:      General: No focal deficit present. Mental Status: She is alert. Mental status is at baseline. Psychiatric:         Mood and Affect: Mood normal.         Speech: Speech normal.         Behavior: Behavior normal.         Cognition and Memory: Memory is impaired. Assessment:       Diagnosis Orders   1. Cellulitis of right leg        2. Open wound of back, unspecified laterality, initial encounter        3. Leg swelling        4. Neuropathy        5. Grade I hemorrhoids                  Plan:   Right leg cellulitis. New. This seems to have developed in the last two days. It looks really quite terrible. I am sure at this point it is infected because of the dirt from her hands since she said this was caused by her scratching significantly at her leg. I will start her on Keflex 500mg every six hours for 10 days to try to treat the infection. I will have one care see her as well. Open wound of mid back. New. This area was erythematous, but it seems to have opened. I do not believe that it is a pressure ulcer as it is too high for that. Patient had a spot that was very itchy several weeks ago right in that same area that she had been messing with and now the wound has just dehisced. I initially just suggested a dry dressing to keep it covered and I did consult wound care who will be seeing her tomorrow. I appreciate further recommendations from them. Leg swelling. Stable. No recent signs of increased swelling in her legs. Patient is not having any weeping of her legs that would suggest she is fluid overloaded, it is just weeping where she has the open wound. Neuropathy. Stable.  Patient is doing a little bit better with the increased dose of gabapentin and she has not had any problems with increased fatigue. At this point, I will continue the gabapentin. Hemorrhoids. Resolved. Patient has not had any further episodes of rectal bleeding. I will continue to monitor. Return in about 1 month (around 2/10/2023) for neuropathy. Patient given educational materials - see patient instructions. Discussed use, benefit, and side effects of prescribed medications. All patient questions answered. Patient voiced understanding. Reviewed health maintenance. Instructed to continue current medications, diet and exercise. Patient agreed with treatment plan. Follow up as directed. Mayi Chavez am personally transcribing for Gloria Edmond MD 1/30/23 at 4:17 PM EST. Malinda Nueñz MD, personally performed the services described in this document as transcribed by the , and it is both accurate and complete.     Electronically signed by Gloria dEmond MD on 1/30/23 at 4:41 PM EST

## 2023-02-01 ENCOUNTER — OUTSIDE SERVICES (OUTPATIENT)
Dept: FAMILY MEDICINE CLINIC | Age: 73
End: 2023-02-01

## 2023-02-01 DIAGNOSIS — G62.9 NEUROPATHY: ICD-10-CM

## 2023-02-01 DIAGNOSIS — L03.115 CELLULITIS OF RIGHT LEG: Primary | ICD-10-CM

## 2023-02-01 DIAGNOSIS — S21.209A OPEN WOUND OF BACK, UNSPECIFIED LATERALITY, INITIAL ENCOUNTER: ICD-10-CM

## 2023-02-01 DIAGNOSIS — R05.3 CHRONIC COUGH: ICD-10-CM

## 2023-02-01 DIAGNOSIS — M79.89 LEG SWELLING: ICD-10-CM

## 2023-02-01 ASSESSMENT — PATIENT HEALTH QUESTIONNAIRE - PHQ9
1. LITTLE INTEREST OR PLEASURE IN DOING THINGS: 0
SUM OF ALL RESPONSES TO PHQ QUESTIONS 1-9: 0
SUM OF ALL RESPONSES TO PHQ9 QUESTIONS 1 & 2: 0
2. FEELING DOWN, DEPRESSED OR HOPELESS: 0
SUM OF ALL RESPONSES TO PHQ QUESTIONS 1-9: 0

## 2023-03-07 ENCOUNTER — OUTSIDE SERVICES (OUTPATIENT)
Dept: FAMILY MEDICINE CLINIC | Age: 73
End: 2023-03-07

## 2023-03-07 ENCOUNTER — TELEPHONE (OUTPATIENT)
Dept: FAMILY MEDICINE CLINIC | Age: 73
End: 2023-03-07

## 2023-03-07 DIAGNOSIS — R05.3 POST-COVID CHRONIC COUGH: ICD-10-CM

## 2023-03-07 DIAGNOSIS — G62.9 NEUROPATHY: ICD-10-CM

## 2023-03-07 DIAGNOSIS — U09.9 POST-COVID CHRONIC COUGH: ICD-10-CM

## 2023-03-07 DIAGNOSIS — I48.11 LONGSTANDING PERSISTENT ATRIAL FIBRILLATION (HCC): Primary | ICD-10-CM

## 2023-03-07 DIAGNOSIS — S81.801D OPEN WOUND OF RIGHT LOWER LEG, SUBSEQUENT ENCOUNTER: ICD-10-CM

## 2023-03-07 RX ORDER — HYDROXYZINE HYDROCHLORIDE 25 MG/1
25 TABLET, FILM COATED ORAL EVERY 6 HOURS PRN
COMMUNITY

## 2023-03-07 RX ORDER — BUDESONIDE AND FORMOTEROL FUMARATE DIHYDRATE 160; 4.5 UG/1; UG/1
2 AEROSOL RESPIRATORY (INHALATION) 2 TIMES DAILY
COMMUNITY

## 2023-03-09 ASSESSMENT — ENCOUNTER SYMPTOMS
COUGH: 1
ABDOMINAL PAIN: 0
SHORTNESS OF BREATH: 0
CONSTIPATION: 0
DIARRHEA: 0
WHEEZING: 0
CHEST TIGHTNESS: 0

## 2023-03-09 NOTE — PROGRESS NOTES
[address]  [address] [address]  Dept: [phone]  Dept Fax: [phone]  Loc: [phone]    Lesa Bullard  is a 81 y.o. female who presents today for her medical conditions/complaints as noted below. Lesa Bullard is c/o of     Chief Complaint   Patient presents with    Cough    Leg Pain    Atrial Fibrillation    Wound Check       HPI:   Tristan Costa is being seen for her regular monthly follow up while at [address]. Patient is doing much better this month than she has been in the past several visits that I have had. She is still coughing but she feels like it is getting a little bit better. Patient also mentioned to me that she is not going to take the Symbicort that I ordered. I asked her why she does not want to take it and she said that she just does not like inhalers so she will not be using that, even though I told her it probably would help she still refused. The wound on her back is much better and she said that the one on her leg is also looking much better. I saw the one on her back Because the bandage had been falling off and she is right it has made significant progress. The one on her leg was bandaged and appeared to be healing. The redness that was surrounding her entire right leg last week was completely gone this week. Patient also said that the pain in her legs is much better. She is not having near as much neuropathy pain, occasionally it wakes her up in the middle of the night when her feet are really burning and painful, but overall she has not had any trouble during the day which she was quite happy about. Patient also does not seem to be having any problems with sleepiness from the gabapentin therefore at this time I will continue it.     Past Medical History:   Diagnosis Date    Longstanding persistent atrial fibrillation ([facility] [address] 12/7/2021    S/P placement of cardiac pacemaker 12/7/2021     History reviewed. No pertinent surgical history. History reviewed. No pertinent family history. Social History     Tobacco Use    Smoking status: Unknown    Smokeless tobacco: Never   Substance Use Topics    Alcohol use: Not on file      Prior to Visit Medications    Medication Sig Taking? Authorizing Provider   hydrOXYzine HCl (ATARAX) 25 MG tablet Take 25 mg by mouth every 6 hours as needed for Itching  Historical Provider, MD   budesonide-formoterol (SYMBICORT) 160-4.5 MCG/ACT AERO Inhale 2 puffs into the lungs 2 times daily  Historical Provider, MD   miconazole (MICOTIN) 2 % powder Apply topically 2 times daily Apply to ABD folds & Bilat breasts topically every shift for redness  Historical Provider, MD   gabapentin (NEURONTIN) 300 MG capsule Take 300 mg by mouth 3 times daily.   Historical Provider, MD   guaiFENesin (MUCINEX) 600 MG extended release tablet Take 1,200 mg by mouth 2 times daily as needed for Congestion  Historical Provider, MD   loperamide (IMODIUM A-D) 2 MG tablet Take 2 mg by mouth 2 times daily as needed for Diarrhea  Historical Provider, MD   triamcinolone (KENALOG) 0.1 % cream Apply topically Apply to arm topically every shift for rash  Historical Provider, MD sanzaiFENesin-dextromethorphan (ROBITUSSIN DM) 100-10 MG/5ML syrup Take 10 mLs by mouth 4 times daily as needed for Cough  Historical Provider, MD   diphenhydrAMINE (BENADRYL) 25 MG tablet Take 25 mg by mouth every 6 hours as needed for Itching  Patient not taking: Reported on 3/7/2023  Historical Provider, MD   acetaminophen (TYLENOL) 325 MG tablet Take 650 mg by mouth every 4 hours as needed  Historical Provider, MD   apixaban (ELIQUIS) 2.5 MG TABS tablet Take 2.5 mg by mouth 2 times daily  Historical Provider, MD   bumetanide (BUMEX) 2 MG tablet Take 2 mg by mouth daily  Historical Provider, MD     Allergies   Allergen Reactions    Penicillins Rash    Sulfa Antibiotics        Subjective:      Review of Systems Constitutional:  Negative for activity change, appetite change, chills, fatigue and fever. Respiratory:  Positive for cough. Negative for chest tightness, shortness of breath and wheezing. Cardiovascular:  Negative for chest pain, palpitations and leg swelling. Gastrointestinal:  Negative for abdominal pain, constipation and diarrhea. Genitourinary:  Negative for difficulty urinating. Skin:  Positive for wound. Negative for rash. Neurological:  Negative for dizziness, syncope, weakness, light-headedness and headaches. Psychiatric/Behavioral:  Positive for confusion and decreased concentration. Negative for behavioral problems, dysphoric mood and sleep disturbance. The patient is not nervous/anxious. Objective:     Physical Exam  Vitals and nursing note reviewed. Constitutional:       General: She is not in acute distress. Appearance: She is well-developed. Eyes:      Conjunctiva/sclera: Conjunctivae normal.   Neck:      Thyroid: No thyromegaly. Cardiovascular:      Rate and Rhythm: Normal rate and regular rhythm. Heart sounds: Normal heart sounds. No murmur heard. Pulmonary:      Effort: Pulmonary effort is normal. No respiratory distress. Breath sounds: Normal breath sounds. No wheezing. Abdominal:      General: Abdomen is flat. Bowel sounds are normal.      Palpations: Abdomen is soft. Tenderness: There is no abdominal tenderness. There is no guarding or rebound. Musculoskeletal:      Cervical back: Normal range of motion and neck supple. Lymphadenopathy:      Cervical: No cervical adenopathy. Skin:     General: Skin is warm and dry. Findings: No erythema or rash. Comments: Healing wound on mid back and wounds on right lower leg are covered with bandages. Neurological:      General: No focal deficit present. Mental Status: She is alert. Mental status is at baseline.    Psychiatric:         Mood and Affect: Mood normal.         Speech: Speech normal.         Behavior: Behavior normal.         Cognition and Memory: Memory is impaired. Assessment:       Diagnosis Orders   1. Cellulitis of right leg        2. Open wound of back, unspecified laterality, initial encounter        3. Leg swelling        4. Neuropathy        5. Chronic cough                  Plan:   Cellulitis of the right leg. Improving. The cellulitis at this point is gone. She does still have open wounds, but they are being treated by wound care and are improving. Open wound of the back. Improving. I was able to see this wound and it looks significantly better. At this point, I will have them continue to do the dressing changes as they have been as it seems to be working well. Leg swelling. Stable. No signs of swelling today. Her legs are chronically large but no problems with swelling. Neuropathy. Improving. Patient seems to be doing better with the gabapentin. She is not having any problems with side effects including sleepiness from the gabapentin. At this time, I will continue her on it and continue to monitor. Chronic cough. Patient thinks maybe slightly better. I did a chest x-ray that did not show any infiltrate or signs of fluid. So I had started her on Symbicort several days ago. Patient told me that she is not interested in taking any inhalers as she had refused Symbicort. I asked her if she would be more willing to take a nebulizer and she said that she has never tried that so she would be willing to try. I instead ordered Duoneb to be done BID and I discontinued the Symbicort. Return in about 1 month (around 3/1/2023) for cough follow up. Patient given educational materials - see patient instructions. Discussed use, benefit, and side effects of prescribed medications. All patient questions answered. Patient voiced understanding. Reviewed health maintenance. Instructed to continue current medications, diet and exercise.   Patient agreed with treatment plan. Follow up as directed. Beryl Edwards am personally transcribing for Adele Aguila MD 3/9/23 at 4:01 PM EST. Mi Sanz MD, personally performed the services described in this document as transcribed by the , and it is both accurate and complete.     Electronically signed by Adele Aguila MD on 3/9/23 at 8:33 PM EST

## 2023-03-29 ASSESSMENT — ENCOUNTER SYMPTOMS
DIARRHEA: 0
CHEST TIGHTNESS: 0
WHEEZING: 0
SHORTNESS OF BREATH: 0
CONSTIPATION: 0
COUGH: 0
ABDOMINAL PAIN: 0

## 2023-03-29 NOTE — PROGRESS NOTES
[address]  [address] [address]  Dept: [phone]  Dept Fax: [phone]  Loc: [phone]    Samantha Reddy  is a 72 y.o. female who presents today for her medical conditions/complaints as noted below. Samantha Reddy is c/o of     Chief Complaint   Patient presents with    Peripheral Neuropathy    Atrial Fibrillation    Cough    Wound Check       HPI:   Jackie Solorio is being seen for her regular monthly follow up while at [place]. Overall, she is doing a lot better this month than she has been. She said her cough is quite a bit better and she has decided that she does like the inhaler better than the nebulizer, so she has been using that more willingly. She has not been having any problems with shortness of breath and no issues with chest pains. She has been eating well. No abdominal pain or constipation. She has not had any more episodes of blood in her brief so whatever caused that seems to have resolved. She does have some new wounds on her right leg the nursing staff was concerned about they are not sore though and she has not really noticed any increase redness in the area just that those have appeared. She said the wound on her back is looking better and so far the wound care doctor is pretty happy with that. Otherwise, she really did not have any complaints. Past Medical History:   Diagnosis Date    Longstanding persistent atrial fibrillation ([address] 12/7/2021    S/P placement of cardiac pacemaker 12/7/2021     History reviewed. No pertinent surgical history. History reviewed. No pertinent family history. Social History     Tobacco Use    Smoking status: Unknown    Smokeless tobacco: Never   Substance Use Topics    Alcohol use: Not on file      Prior to Visit Medications    Medication Sig Taking?  Authorizing Provider   hydrOXYzine HCl (ATARAX) 25 MG tablet Take 25 mg by mouth every 6

## 2023-04-04 ENCOUNTER — OUTSIDE SERVICES (OUTPATIENT)
Dept: FAMILY MEDICINE CLINIC | Age: 73
End: 2023-04-04

## 2023-04-04 DIAGNOSIS — R05.3 POST-COVID CHRONIC COUGH: ICD-10-CM

## 2023-04-04 DIAGNOSIS — G62.9 NEUROPATHY: ICD-10-CM

## 2023-04-04 DIAGNOSIS — I48.11 LONGSTANDING PERSISTENT ATRIAL FIBRILLATION (HCC): Primary | ICD-10-CM

## 2023-04-04 DIAGNOSIS — S81.801D OPEN WOUND OF RIGHT LOWER LEG, SUBSEQUENT ENCOUNTER: ICD-10-CM

## 2023-04-04 DIAGNOSIS — U09.9 POST-COVID CHRONIC COUGH: ICD-10-CM

## 2023-04-04 PROCEDURE — 99309 SBSQ NF CARE MODERATE MDM 30: CPT | Performed by: FAMILY MEDICINE

## 2023-04-07 ENCOUNTER — TELEPHONE (OUTPATIENT)
Dept: FAMILY MEDICINE CLINIC | Age: 73
End: 2023-04-07

## 2023-04-07 RX ORDER — HYDROXYZINE HYDROCHLORIDE 25 MG/1
25 TABLET, FILM COATED ORAL
COMMUNITY

## 2023-04-19 ASSESSMENT — ENCOUNTER SYMPTOMS
SHORTNESS OF BREATH: 0
CHEST TIGHTNESS: 0
CONSTIPATION: 0
ABDOMINAL PAIN: 0
COUGH: 0
DIARRHEA: 0
WHEEZING: 0

## 2023-05-02 ENCOUNTER — OUTSIDE SERVICES (OUTPATIENT)
Dept: FAMILY MEDICINE CLINIC | Age: 73
End: 2023-05-02

## 2023-05-02 DIAGNOSIS — R05.3 POST-COVID CHRONIC COUGH: ICD-10-CM

## 2023-05-02 DIAGNOSIS — S81.801D OPEN WOUND OF RIGHT LOWER LEG, SUBSEQUENT ENCOUNTER: ICD-10-CM

## 2023-05-02 DIAGNOSIS — G62.9 NEUROPATHY: Primary | ICD-10-CM

## 2023-05-02 DIAGNOSIS — U09.9 POST-COVID CHRONIC COUGH: ICD-10-CM

## 2023-05-02 DIAGNOSIS — M79.89 LEG SWELLING: ICD-10-CM

## 2023-05-10 ENCOUNTER — TELEPHONE (OUTPATIENT)
Dept: FAMILY MEDICINE CLINIC | Age: 73
End: 2023-05-10

## 2023-06-01 ASSESSMENT — ENCOUNTER SYMPTOMS
ABDOMINAL PAIN: 0
WHEEZING: 0
SHORTNESS OF BREATH: 0
DIARRHEA: 0
COUGH: 1
CHEST TIGHTNESS: 0
CONSTIPATION: 0

## 2023-06-01 NOTE — PROGRESS NOTES
Positive for headaches. Negative for dizziness, syncope, weakness and light-headedness. Psychiatric/Behavioral:  Positive for confusion and decreased concentration. Negative for behavioral problems, dysphoric mood and sleep disturbance. The patient is not nervous/anxious. Objective:     Physical Exam  Vitals and nursing note reviewed. Constitutional:       General: She is not in acute distress. Appearance: She is well-developed. She is morbidly obese. Eyes:      Conjunctiva/sclera: Conjunctivae normal.   Neck:      Thyroid: No thyromegaly. Cardiovascular:      Rate and Rhythm: Normal rate and regular rhythm. Heart sounds: Normal heart sounds. No murmur heard. Pulmonary:      Effort: Pulmonary effort is normal. No respiratory distress. Breath sounds: Normal breath sounds. No wheezing. Abdominal:      General: Abdomen is flat. Bowel sounds are normal.      Palpations: Abdomen is soft. Tenderness: There is no abdominal tenderness. There is no guarding or rebound. Musculoskeletal:      Cervical back: Normal range of motion and neck supple. Lymphadenopathy:      Cervical: No cervical adenopathy. Skin:     General: Skin is warm and dry. Findings: No erythema or rash. Neurological:      General: No focal deficit present. Mental Status: She is alert. Mental status is at baseline. Psychiatric:         Mood and Affect: Mood normal.         Speech: Speech normal.         Behavior: Behavior normal.         Cognition and Memory: Memory is impaired. Assessment:       Diagnosis Orders   1. Neuropathy        2. Post-COVID chronic cough        3. Open wound of right lower leg, subsequent encounter        4. Leg swelling                  Plan:   Neuropathy. Worsening. Overall, patient says that she is doing worse, but she is still getting relief from the gabapentin. At this time, I will go ahead and continue the gabapentin. Post-COVID chronic cough. Stable.  Patient has

## 2023-06-06 ENCOUNTER — OUTSIDE SERVICES (OUTPATIENT)
Dept: FAMILY MEDICINE CLINIC | Age: 73
End: 2023-06-06
Payer: MEDICAID

## 2023-06-06 DIAGNOSIS — M79.89 LEG SWELLING: ICD-10-CM

## 2023-06-06 DIAGNOSIS — G62.9 NEUROPATHY: Primary | ICD-10-CM

## 2023-06-06 DIAGNOSIS — J30.2 SEASONAL ALLERGIES: ICD-10-CM

## 2023-06-06 DIAGNOSIS — S81.801D OPEN WOUND OF RIGHT LOWER LEG, SUBSEQUENT ENCOUNTER: ICD-10-CM

## 2023-06-06 DIAGNOSIS — I48.11 LONGSTANDING PERSISTENT ATRIAL FIBRILLATION (HCC): ICD-10-CM

## 2023-06-06 PROCEDURE — 99309 SBSQ NF CARE MODERATE MDM 30: CPT | Performed by: FAMILY MEDICINE

## 2023-06-07 ENCOUNTER — TELEPHONE (OUTPATIENT)
Dept: FAMILY MEDICINE CLINIC | Age: 73
End: 2023-06-07

## 2023-06-07 RX ORDER — ONDANSETRON 4 MG/1
4 TABLET, FILM COATED ORAL EVERY 6 HOURS PRN
COMMUNITY

## 2023-06-07 RX ORDER — LORATADINE 10 MG/1
10 TABLET ORAL DAILY
COMMUNITY

## 2023-06-30 DIAGNOSIS — L97.929 ULCER OF LEFT LOWER EXTREMITY, UNSPECIFIED ULCER STAGE (HCC): Primary | ICD-10-CM

## 2023-06-30 RX ORDER — HYDROCODONE BITARTRATE AND ACETAMINOPHEN 5; 325 MG/1; MG/1
1 TABLET ORAL EVERY 6 HOURS PRN
Qty: 10 TABLET | Refills: 0 | Status: SHIPPED | OUTPATIENT
Start: 2023-06-30 | End: 2023-07-03

## 2023-07-03 ENCOUNTER — APPOINTMENT (OUTPATIENT)
Dept: GENERAL RADIOLOGY | Age: 73
DRG: 603 | End: 2023-07-03
Payer: MEDICARE

## 2023-07-03 ENCOUNTER — HOSPITAL ENCOUNTER (INPATIENT)
Age: 73
LOS: 3 days | Discharge: OTHER INSTITUTION | DRG: 603 | End: 2023-07-06
Attending: EMERGENCY MEDICINE | Admitting: INTERNAL MEDICINE
Payer: MEDICARE

## 2023-07-03 DIAGNOSIS — L97.929 ULCER OF LEFT LOWER EXTREMITY, UNSPECIFIED ULCER STAGE (HCC): ICD-10-CM

## 2023-07-03 DIAGNOSIS — L03.116 CELLULITIS OF LEFT LOWER EXTREMITY: Primary | ICD-10-CM

## 2023-07-03 LAB
ALBUMIN SERPL-MCNC: 3.6 G/DL (ref 3.5–5.2)
ALBUMIN/GLOB SERPL: 1 {RATIO} (ref 1–2.5)
ALP SERPL-CCNC: 82 U/L (ref 35–104)
ALT SERPL-CCNC: 24 U/L (ref 5–33)
ANION GAP SERPL CALCULATED.3IONS-SCNC: 10 MMOL/L (ref 9–17)
AST SERPL-CCNC: 36 U/L
BASOPHILS # BLD: <0.03 K/UL (ref 0–0.2)
BASOPHILS NFR BLD: 0 % (ref 0–2)
BILIRUB SERPL-MCNC: 0.9 MG/DL (ref 0.3–1.2)
BNP SERPL-MCNC: 1480 PG/ML
BUN SERPL-MCNC: 15 MG/DL (ref 8–23)
BUN/CREAT SERPL: 22 (ref 9–20)
CALCIUM SERPL-MCNC: 9.3 MG/DL (ref 8.6–10.4)
CHLORIDE SERPL-SCNC: 99 MMOL/L (ref 98–107)
CO2 SERPL-SCNC: 32 MMOL/L (ref 20–31)
CREAT SERPL-MCNC: 0.68 MG/DL (ref 0.5–0.9)
EKG Q-T INTERVAL: 468 MS
EKG QRS DURATION: 74 MS
EKG QTC CALCULATION (BAZETT): 519 MS
EKG R AXIS: -26 DEGREES
EKG T AXIS: -96 DEGREES
EKG VENTRICULAR RATE: 74 BPM
EOSINOPHIL # BLD: 0.17 K/UL (ref 0–0.44)
EOSINOPHILS RELATIVE PERCENT: 2 % (ref 1–4)
ERYTHROCYTE [DISTWIDTH] IN BLOOD BY AUTOMATED COUNT: 14.2 % (ref 11.8–14.4)
GFR SERPL CREATININE-BSD FRML MDRD: >60 ML/MIN/1.73M2
GLUCOSE SERPL-MCNC: 166 MG/DL (ref 70–99)
HCT VFR BLD AUTO: 40.5 % (ref 36.3–47.1)
HGB BLD-MCNC: 13 G/DL (ref 11.9–15.1)
IMM GRANULOCYTES # BLD AUTO: 0.06 K/UL (ref 0–0.3)
IMM GRANULOCYTES NFR BLD: 1 %
INR PPP: 1.2
LACTATE BLDV-SCNC: 1 MMOL/L (ref 0.5–2.2)
LACTATE BLDV-SCNC: 2.1 MMOL/L (ref 0.5–2.2)
LACTATE BLDV-SCNC: 2.2 MMOL/L (ref 0.5–1.9)
LACTATE BLDV-SCNC: 2.4 MMOL/L (ref 0.5–1.9)
LYMPHOCYTES # BLD: 13 % (ref 24–43)
LYMPHOCYTES NFR BLD: 0.91 K/UL (ref 1.1–3.7)
MCH RBC QN AUTO: 29.3 PG (ref 25.2–33.5)
MCHC RBC AUTO-ENTMCNC: 32.1 G/DL (ref 25.2–33.5)
MCV RBC AUTO: 91.4 FL (ref 82.6–102.9)
MONOCYTES NFR BLD: 0.61 K/UL (ref 0.1–1.2)
MONOCYTES NFR BLD: 9 % (ref 3–12)
NEUTROPHILS NFR BLD: 75 % (ref 36–65)
NEUTS SEG NFR BLD: 5.33 K/UL (ref 1.5–8.1)
NRBC BLD-RTO: 0 PER 100 WBC
PLATELET # BLD AUTO: 151 K/UL (ref 138–453)
PMV BLD AUTO: 10.3 FL (ref 8.1–13.5)
POTASSIUM SERPL-SCNC: 3.9 MMOL/L (ref 3.7–5.3)
PROT SERPL-MCNC: 7.2 G/DL (ref 6.4–8.3)
PROTHROMBIN TIME: 14.9 SEC (ref 11.5–14.2)
RBC # BLD AUTO: 4.43 M/UL (ref 3.95–5.11)
SARS-COV-2 RDRP RESP QL NAA+PROBE: NOT DETECTED
SODIUM SERPL-SCNC: 141 MMOL/L (ref 135–144)
SPECIMEN DESCRIPTION: NORMAL
TROPONIN I SERPL HS-MCNC: 26 NG/L (ref 0–14)
TROPONIN I SERPL HS-MCNC: 27 NG/L (ref 0–14)
WBC OTHER # BLD: 7.1 K/UL (ref 3.5–11.3)

## 2023-07-03 PROCEDURE — 99222 1ST HOSP IP/OBS MODERATE 55: CPT | Performed by: INTERNAL MEDICINE

## 2023-07-03 PROCEDURE — 2580000003 HC RX 258: Performed by: EMERGENCY MEDICINE

## 2023-07-03 PROCEDURE — 36415 COLL VENOUS BLD VENIPUNCTURE: CPT

## 2023-07-03 PROCEDURE — 2500000003 HC RX 250 WO HCPCS: Performed by: INTERNAL MEDICINE

## 2023-07-03 PROCEDURE — 2580000003 HC RX 258: Performed by: INTERNAL MEDICINE

## 2023-07-03 PROCEDURE — 83880 ASSAY OF NATRIURETIC PEPTIDE: CPT

## 2023-07-03 PROCEDURE — 87635 SARS-COV-2 COVID-19 AMP PRB: CPT

## 2023-07-03 PROCEDURE — 84484 ASSAY OF TROPONIN QUANT: CPT

## 2023-07-03 PROCEDURE — 6370000000 HC RX 637 (ALT 250 FOR IP): Performed by: INTERNAL MEDICINE

## 2023-07-03 PROCEDURE — 85027 COMPLETE CBC AUTOMATED: CPT

## 2023-07-03 PROCEDURE — 94640 AIRWAY INHALATION TREATMENT: CPT

## 2023-07-03 PROCEDURE — 93005 ELECTROCARDIOGRAM TRACING: CPT | Performed by: EMERGENCY MEDICINE

## 2023-07-03 PROCEDURE — 2060000000 HC ICU INTERMEDIATE R&B

## 2023-07-03 PROCEDURE — 99285 EMERGENCY DEPT VISIT HI MDM: CPT

## 2023-07-03 PROCEDURE — 6360000002 HC RX W HCPCS: Performed by: EMERGENCY MEDICINE

## 2023-07-03 PROCEDURE — 85610 PROTHROMBIN TIME: CPT

## 2023-07-03 PROCEDURE — 6360000002 HC RX W HCPCS: Performed by: INTERNAL MEDICINE

## 2023-07-03 PROCEDURE — 87040 BLOOD CULTURE FOR BACTERIA: CPT

## 2023-07-03 PROCEDURE — 83605 ASSAY OF LACTIC ACID: CPT

## 2023-07-03 PROCEDURE — 71045 X-RAY EXAM CHEST 1 VIEW: CPT

## 2023-07-03 PROCEDURE — 80053 COMPREHEN METABOLIC PANEL: CPT

## 2023-07-03 RX ORDER — ONDANSETRON 2 MG/ML
4 INJECTION INTRAMUSCULAR; INTRAVENOUS EVERY 6 HOURS PRN
Status: DISCONTINUED | OUTPATIENT
Start: 2023-07-03 | End: 2023-07-06 | Stop reason: HOSPADM

## 2023-07-03 RX ORDER — ALBUTEROL SULFATE 2.5 MG/3ML
2.5 SOLUTION RESPIRATORY (INHALATION)
Status: DISCONTINUED | OUTPATIENT
Start: 2023-07-03 | End: 2023-07-06 | Stop reason: HOSPADM

## 2023-07-03 RX ORDER — HYDROCODONE BITARTRATE AND ACETAMINOPHEN 5; 325 MG/1; MG/1
1 TABLET ORAL EVERY 6 HOURS PRN
Status: DISCONTINUED | OUTPATIENT
Start: 2023-07-03 | End: 2023-07-06 | Stop reason: HOSPADM

## 2023-07-03 RX ORDER — CETIRIZINE HYDROCHLORIDE 5 MG/1
10 TABLET ORAL DAILY
Status: DISCONTINUED | OUTPATIENT
Start: 2023-07-03 | End: 2023-07-06 | Stop reason: HOSPADM

## 2023-07-03 RX ORDER — GABAPENTIN 300 MG/1
300 CAPSULE ORAL 3 TIMES DAILY
Status: DISCONTINUED | OUTPATIENT
Start: 2023-07-03 | End: 2023-07-06 | Stop reason: HOSPADM

## 2023-07-03 RX ORDER — ACETAMINOPHEN 325 MG/1
650 TABLET ORAL EVERY 4 HOURS PRN
Status: DISCONTINUED | OUTPATIENT
Start: 2023-07-03 | End: 2023-07-06 | Stop reason: HOSPADM

## 2023-07-03 RX ORDER — BUMETANIDE 1 MG/1
2 TABLET ORAL DAILY
Status: DISCONTINUED | OUTPATIENT
Start: 2023-07-03 | End: 2023-07-06 | Stop reason: HOSPADM

## 2023-07-03 RX ORDER — SODIUM CHLORIDE FOR INHALATION 0.9 %
3 VIAL, NEBULIZER (ML) INHALATION
Status: DISCONTINUED | OUTPATIENT
Start: 2023-07-03 | End: 2023-07-06 | Stop reason: HOSPADM

## 2023-07-03 RX ORDER — SODIUM CHLORIDE 0.9 % (FLUSH) 0.9 %
10 SYRINGE (ML) INJECTION PRN
Status: DISCONTINUED | OUTPATIENT
Start: 2023-07-03 | End: 2023-07-06 | Stop reason: HOSPADM

## 2023-07-03 RX ORDER — SODIUM CHLORIDE 9 MG/ML
INJECTION, SOLUTION INTRAVENOUS PRN
Status: DISCONTINUED | OUTPATIENT
Start: 2023-07-03 | End: 2023-07-06 | Stop reason: HOSPADM

## 2023-07-03 RX ORDER — 0.9 % SODIUM CHLORIDE 0.9 %
1000 INTRAVENOUS SOLUTION INTRAVENOUS ONCE
Status: COMPLETED | OUTPATIENT
Start: 2023-07-03 | End: 2023-07-03

## 2023-07-03 RX ORDER — SODIUM CHLORIDE 0.9 % (FLUSH) 0.9 %
10 SYRINGE (ML) INJECTION EVERY 12 HOURS SCHEDULED
Status: DISCONTINUED | OUTPATIENT
Start: 2023-07-03 | End: 2023-07-06 | Stop reason: HOSPADM

## 2023-07-03 RX ADMIN — CEFAZOLIN 1000 MG: 1 INJECTION, POWDER, FOR SOLUTION INTRAMUSCULAR; INTRAVENOUS at 23:57

## 2023-07-03 RX ADMIN — GABAPENTIN 300 MG: 300 CAPSULE ORAL at 20:30

## 2023-07-03 RX ADMIN — BUMETANIDE 2 MG: 1 TABLET ORAL at 20:30

## 2023-07-03 RX ADMIN — CEFAZOLIN 1000 MG: 1 INJECTION, POWDER, FOR SOLUTION INTRAMUSCULAR; INTRAVENOUS at 15:45

## 2023-07-03 RX ADMIN — MOMETASONE FUROATE AND FORMOTEROL FUMARATE DIHYDRATE 2 PUFF: 200; 5 AEROSOL RESPIRATORY (INHALATION) at 19:58

## 2023-07-03 RX ADMIN — SODIUM CHLORIDE 1000 ML: 9 INJECTION, SOLUTION INTRAVENOUS at 15:55

## 2023-07-03 RX ADMIN — MICONAZOLE NITRATE: 20 POWDER TOPICAL at 20:30

## 2023-07-03 RX ADMIN — APIXABAN 2.5 MG: 2.5 TABLET, FILM COATED ORAL at 20:30

## 2023-07-03 RX ADMIN — SODIUM CHLORIDE, PRESERVATIVE FREE 10 ML: 5 INJECTION INTRAVENOUS at 20:30

## 2023-07-03 RX ADMIN — CETIRIZINE HYDROCHLORIDE 10 MG: 5 TABLET, FILM COATED ORAL at 20:30

## 2023-07-03 ASSESSMENT — PAIN DESCRIPTION - PAIN TYPE: TYPE: ACUTE PAIN

## 2023-07-03 ASSESSMENT — ENCOUNTER SYMPTOMS
BACK PAIN: 0
COUGH: 0
CONSTIPATION: 0
NAUSEA: 0
ABDOMINAL PAIN: 0
VOMITING: 0
EYE PAIN: 0
DIARRHEA: 0
SHORTNESS OF BREATH: 0
BLOOD IN STOOL: 0

## 2023-07-03 ASSESSMENT — LIFESTYLE VARIABLES
HOW MANY STANDARD DRINKS CONTAINING ALCOHOL DO YOU HAVE ON A TYPICAL DAY: PATIENT DOES NOT DRINK
HOW OFTEN DO YOU HAVE A DRINK CONTAINING ALCOHOL: NEVER
HOW OFTEN DO YOU HAVE A DRINK CONTAINING ALCOHOL: NEVER

## 2023-07-03 ASSESSMENT — PAIN DESCRIPTION - LOCATION: LOCATION: LEG

## 2023-07-03 ASSESSMENT — PAIN DESCRIPTION - FREQUENCY: FREQUENCY: CONTINUOUS

## 2023-07-03 ASSESSMENT — PAIN DESCRIPTION - DESCRIPTORS: DESCRIPTORS: OTHER (COMMENT)

## 2023-07-03 ASSESSMENT — PAIN SCALES - GENERAL: PAINLEVEL_OUTOF10: 5

## 2023-07-03 ASSESSMENT — PAIN DESCRIPTION - ORIENTATION: ORIENTATION: LEFT

## 2023-07-03 NOTE — ED NOTES
Pt noted to have erythema to left lower leg mid shin to ankle. Pt is noted to have two wounds on left leg, medial aspect. The lower wound is larger with than upper wound. Both were found to be covered with a light tan material, presumably calcium alginate per E[facility] report. Writer removed that from both wounds and found wounds to have a yellow/slough appearance. Wounds have irregular borders, appear moist.  Writer cleansed both with saline and gauze. The bottom wound had black loose skin over it which wiped off easily as writer cleaned the area. Writer then left the areas open to air so Dr. Diandra Estrada can look at them.      Emy Hirsch RN  07/03/23 [id number]

## 2023-07-03 NOTE — ED PROVIDER NOTES
[facility]  eMERGENCY dEPARTMENT eNCOUnter      Pt Name: Ej Ramos  MRN: [id number]  [address] [facility] [place] 1950  Date of evaluation: 7/3/2023      CHIEF COMPLAINT       Chief Complaint   Patient presents with    Wound Check     Left lower leg         HISTORY OF PRESENT ILLNESS    Ej Ramos is a 72 y.o. female who presents with a left lower leg wound this is been going on for some time she tells me but is much worse much more sore recently has had a little bit of drainage and more red than usual she denies any chest pain or shortness of breath no fevers or chills no cough      REVIEW OF SYSTEMS         Review of Systems   Constitutional:  Negative for chills and fever. HENT:  Negative for congestion and ear pain. Eyes:  Negative for pain and visual disturbance. Respiratory:  Negative for cough and shortness of breath. Cardiovascular:  Positive for leg swelling. Negative for chest pain and palpitations. Gastrointestinal:  Negative for abdominal pain, blood in stool, constipation, diarrhea, nausea and vomiting. Endocrine: Negative for polydipsia and polyuria. Genitourinary:  Negative for difficulty urinating, dysuria and frequency. Musculoskeletal:  Negative for back pain, joint swelling, myalgias, neck pain and neck stiffness. Left lower leg pain and swelling pains mostly anterior also redness with wounds and drainage   Skin:  Negative for rash. Neurological:  Negative for dizziness, weakness and headaches. Hematological:  Negative for adenopathy. Does not bruise/bleed easily. Psychiatric/Behavioral:  Negative for confusion, self-injury and suicidal ideas. PAST MEDICAL HISTORY    has a past medical history of Longstanding persistent atrial fibrillation ([address]) and S/P placement of cardiac pacemaker. SURGICAL HISTORY      has no past surgical history on file.     CURRENT MEDICATIONS       Previous Medications    ACETAMINOPHEN (TYLENOL) 325 MG TABLET    Take 2

## 2023-07-03 NOTE — H&P
HOSPITALIST ADMISSION H&P      REASON FOR ADMISSION:  LLE cellulitis--ulcer  ESTIMATED LENGTH OF STAY:   > 2 midnights---2-4 days    ATTENDING/ADMITTING PHYSICIAN: Jasson Rubio MD MD  PCP: Nikolai Stout MD    HISTORY OF PRESENT ILLNESS:      The patient is a 72 y.o. female patient of Nikolai Stout MD who presents with LLE cellulitis---increasing pain--tenderness---redness---acute-on-chronic---ulceration--callouses plantar surface---\"bubble wrap\" appearance skin surface. Lactic acid slightly elevated 2.4 ---> 2.2    Has a pacemaker-PM---see below----paced alternating with atrial fibrillation---chronic    Morbid obesity--reported as 3-assist--known gait-balance instability    Cognitive impairment--dementia    Former tobacco smoker----quit---2000      See below for additional PMH. Patient nwzt-syrosjwsme-zbxfiicr-available records reviewed, including, but not limited to, ER reports--labs--imaging--EKGs--OSH reports--office records---personal notes. Past Medical History:   Diagnosis Date    Longstanding persistent atrial fibrillation ([address]) 12/7/2021    S/P placement of cardiac pacemaker 12/7/2021           History reviewed. No pertinent surgical history. Medications Prior to Admission:    Not in a hospital admission. Allergies:    Penicillins and Sulfa antibiotics    Social History:    Tobacco abuse--cigarettes---5.23.1970--quit---6.14.2000. She has never used smokeless tobacco.  No drugs--no marijuana--no ETOH---no vaping    Family History:     Cancer--type?--sister--paternal grandfather--uncle;  Alzheimer's--sister, DM2--mother, CAD--father--maternal grandmother;  Breast cancer--NHL--non-Hodgkin's lymphoma--relative unspecified;  Brother--tb--uncertain if treated or family members treated    REVIEW OF SYSTEMS:  See HPI and problem list; otherwise no other new complaints with respect to HEENT, neck, pulmonary, coronary, GI, , endocrine, musculoskeletal, immune system/connective tissue disease,

## 2023-07-04 ENCOUNTER — APPOINTMENT (OUTPATIENT)
Dept: GENERAL RADIOLOGY | Age: 73
DRG: 603 | End: 2023-07-04
Payer: MEDICARE

## 2023-07-04 PROBLEM — G31.84 MILD COGNITIVE IMPAIRMENT OF UNCERTAIN OR UNKNOWN ETIOLOGY: Status: ACTIVE | Noted: 2021-12-06

## 2023-07-04 PROBLEM — Z86.16 PERSONAL HISTORY OF COVID-19: Status: ACTIVE | Noted: 2021-12-19

## 2023-07-04 PROBLEM — Z95.0 PACEMAKER: Status: ACTIVE | Noted: 2021-12-04

## 2023-07-04 PROBLEM — L97.921 ULCER OF LEFT LOWER LEG, LIMITED TO BREAKDOWN OF SKIN (HCC): Status: ACTIVE | Noted: 2021-10-18

## 2023-07-04 PROBLEM — M62.81 MUSCLE WEAKNESS (GENERALIZED): Status: ACTIVE | Noted: 2021-12-06

## 2023-07-04 PROBLEM — I44.1 MOBITZ (TYPE) I (WENCKEBACH'S) ATRIOVENTRICULAR BLOCK: Status: ACTIVE | Noted: 2017-08-07

## 2023-07-04 PROBLEM — K21.9 GASTRO-ESOPHAGEAL REFLUX DISEASE WITHOUT ESOPHAGITIS: Status: ACTIVE | Noted: 2021-12-06

## 2023-07-04 PROBLEM — I48.0 PAF (PAROXYSMAL ATRIAL FIBRILLATION) (HCC): Status: ACTIVE | Noted: 2021-10-18

## 2023-07-04 PROBLEM — E66.01 MORBID (SEVERE) OBESITY DUE TO EXCESS CALORIES (HCC): Status: ACTIVE | Noted: 2022-03-10

## 2023-07-04 PROBLEM — F03.90 UNSPECIFIED DEMENTIA, UNSPECIFIED SEVERITY, WITHOUT BEHAVIORAL DISTURBANCE, PSYCHOTIC DISTURBANCE, MOOD DISTURBANCE, AND ANXIETY (HCC): Status: ACTIVE | Noted: 2022-10-01

## 2023-07-04 PROBLEM — R26.2 DIFFICULTY IN WALKING, NOT ELSEWHERE CLASSIFIED: Status: ACTIVE | Noted: 2023-04-27

## 2023-07-04 PROBLEM — R55 SYNCOPE AND COLLAPSE: Status: ACTIVE | Noted: 2017-08-07

## 2023-07-04 PROBLEM — I44.1 ATRIOVENTRICULAR BLOCK, SECOND DEGREE: Status: ACTIVE | Noted: 2021-12-06

## 2023-07-04 LAB
ANION GAP SERPL CALCULATED.3IONS-SCNC: 9 MMOL/L (ref 9–17)
BASOPHILS # BLD: <0.03 K/UL (ref 0–0.2)
BASOPHILS NFR BLD: 0 % (ref 0–2)
BUN SERPL-MCNC: 14 MG/DL (ref 8–23)
BUN/CREAT SERPL: 25 (ref 9–20)
CALCIUM SERPL-MCNC: 8.7 MG/DL (ref 8.6–10.4)
CHLORIDE SERPL-SCNC: 103 MMOL/L (ref 98–107)
CO2 SERPL-SCNC: 29 MMOL/L (ref 20–31)
CREAT SERPL-MCNC: 0.56 MG/DL (ref 0.5–0.9)
EOSINOPHIL # BLD: 0.2 K/UL (ref 0–0.44)
EOSINOPHILS RELATIVE PERCENT: 3 % (ref 1–4)
ERYTHROCYTE [DISTWIDTH] IN BLOOD BY AUTOMATED COUNT: 14.3 % (ref 11.8–14.4)
GFR SERPL CREATININE-BSD FRML MDRD: >60 ML/MIN/1.73M2
GLUCOSE SERPL-MCNC: 100 MG/DL (ref 70–99)
HCT VFR BLD AUTO: 36.4 % (ref 36.3–47.1)
HGB BLD-MCNC: 11.9 G/DL (ref 11.9–15.1)
IMM GRANULOCYTES # BLD AUTO: 0.05 K/UL (ref 0–0.3)
IMM GRANULOCYTES NFR BLD: 1 %
LYMPHOCYTES # BLD: 13 % (ref 24–43)
LYMPHOCYTES NFR BLD: 0.83 K/UL (ref 1.1–3.7)
MAGNESIUM SERPL-MCNC: 1.9 MG/DL (ref 1.6–2.6)
MCH RBC QN AUTO: 29.6 PG (ref 25.2–33.5)
MCHC RBC AUTO-ENTMCNC: 32.7 G/DL (ref 25.2–33.5)
MCV RBC AUTO: 90.5 FL (ref 82.6–102.9)
MONOCYTES NFR BLD: 0.68 K/UL (ref 0.1–1.2)
MONOCYTES NFR BLD: 11 % (ref 3–12)
NEUTROPHILS NFR BLD: 72 % (ref 36–65)
NEUTS SEG NFR BLD: 4.4 K/UL (ref 1.5–8.1)
NRBC BLD-RTO: 0 PER 100 WBC
PLATELET # BLD AUTO: 135 K/UL (ref 138–453)
PMV BLD AUTO: 10.5 FL (ref 8.1–13.5)
POTASSIUM SERPL-SCNC: 3.5 MMOL/L (ref 3.7–5.3)
RBC # BLD AUTO: 4.02 M/UL (ref 3.95–5.11)
SODIUM SERPL-SCNC: 141 MMOL/L (ref 135–144)
WBC OTHER # BLD: 6.2 K/UL (ref 3.5–11.3)

## 2023-07-04 PROCEDURE — 36415 COLL VENOUS BLD VENIPUNCTURE: CPT

## 2023-07-04 PROCEDURE — 6370000000 HC RX 637 (ALT 250 FOR IP): Performed by: NURSE PRACTITIONER

## 2023-07-04 PROCEDURE — 94760 N-INVAS EAR/PLS OXIMETRY 1: CPT

## 2023-07-04 PROCEDURE — 71045 X-RAY EXAM CHEST 1 VIEW: CPT

## 2023-07-04 PROCEDURE — 93005 ELECTROCARDIOGRAM TRACING: CPT | Performed by: INTERNAL MEDICINE

## 2023-07-04 PROCEDURE — 83735 ASSAY OF MAGNESIUM: CPT

## 2023-07-04 PROCEDURE — 6370000000 HC RX 637 (ALT 250 FOR IP): Performed by: INTERNAL MEDICINE

## 2023-07-04 PROCEDURE — 2580000003 HC RX 258: Performed by: INTERNAL MEDICINE

## 2023-07-04 PROCEDURE — 99232 SBSQ HOSP IP/OBS MODERATE 35: CPT | Performed by: FAMILY MEDICINE

## 2023-07-04 PROCEDURE — 80048 BASIC METABOLIC PNL TOTAL CA: CPT

## 2023-07-04 PROCEDURE — 6360000002 HC RX W HCPCS: Performed by: INTERNAL MEDICINE

## 2023-07-04 PROCEDURE — 2060000000 HC ICU INTERMEDIATE R&B

## 2023-07-04 PROCEDURE — 94640 AIRWAY INHALATION TREATMENT: CPT

## 2023-07-04 PROCEDURE — 99222 1ST HOSP IP/OBS MODERATE 55: CPT | Performed by: SURGERY

## 2023-07-04 PROCEDURE — 85027 COMPLETE CBC AUTOMATED: CPT

## 2023-07-04 RX ORDER — POTASSIUM CHLORIDE 20 MEQ/1
40 TABLET, EXTENDED RELEASE ORAL ONCE
Status: COMPLETED | OUTPATIENT
Start: 2023-07-04 | End: 2023-07-04

## 2023-07-04 RX ADMIN — MICONAZOLE NITRATE: 20 POWDER TOPICAL at 10:33

## 2023-07-04 RX ADMIN — MOMETASONE FUROATE AND FORMOTEROL FUMARATE DIHYDRATE 2 PUFF: 200; 5 AEROSOL RESPIRATORY (INHALATION) at 20:18

## 2023-07-04 RX ADMIN — SODIUM CHLORIDE 20 ML: 9 INJECTION, SOLUTION INTRAVENOUS at 10:32

## 2023-07-04 RX ADMIN — APIXABAN 2.5 MG: 2.5 TABLET, FILM COATED ORAL at 10:28

## 2023-07-04 RX ADMIN — CEFAZOLIN 1000 MG: 1 INJECTION, POWDER, FOR SOLUTION INTRAMUSCULAR; INTRAVENOUS at 17:00

## 2023-07-04 RX ADMIN — BUMETANIDE 2 MG: 1 TABLET ORAL at 10:28

## 2023-07-04 RX ADMIN — MOMETASONE FUROATE AND FORMOTEROL FUMARATE DIHYDRATE 2 PUFF: 200; 5 AEROSOL RESPIRATORY (INHALATION) at 08:15

## 2023-07-04 RX ADMIN — SODIUM CHLORIDE 20 ML: 9 INJECTION, SOLUTION INTRAVENOUS at 16:59

## 2023-07-04 RX ADMIN — POTASSIUM CHLORIDE 40 MEQ: 1500 TABLET, EXTENDED RELEASE ORAL at 06:07

## 2023-07-04 RX ADMIN — APIXABAN 2.5 MG: 2.5 TABLET, FILM COATED ORAL at 20:39

## 2023-07-04 RX ADMIN — GABAPENTIN 300 MG: 300 CAPSULE ORAL at 13:32

## 2023-07-04 RX ADMIN — GABAPENTIN 300 MG: 300 CAPSULE ORAL at 20:39

## 2023-07-04 RX ADMIN — CEFAZOLIN 1000 MG: 1 INJECTION, POWDER, FOR SOLUTION INTRAMUSCULAR; INTRAVENOUS at 10:32

## 2023-07-04 RX ADMIN — GABAPENTIN 300 MG: 300 CAPSULE ORAL at 10:28

## 2023-07-04 RX ADMIN — SODIUM CHLORIDE, PRESERVATIVE FREE 10 ML: 5 INJECTION INTRAVENOUS at 20:39

## 2023-07-04 RX ADMIN — CETIRIZINE HYDROCHLORIDE 10 MG: 5 TABLET, FILM COATED ORAL at 10:28

## 2023-07-04 RX ADMIN — MICONAZOLE NITRATE: 20 POWDER TOPICAL at 20:39

## 2023-07-04 RX ADMIN — SODIUM CHLORIDE, PRESERVATIVE FREE 10 ML: 5 INJECTION INTRAVENOUS at 10:28

## 2023-07-04 ASSESSMENT — PAIN SCALES - GENERAL
PAINLEVEL_OUTOF10: 0
PAINLEVEL_OUTOF10: 0

## 2023-07-04 NOTE — PROGRESS NOTES
rounding in PCU. Assessment: Patient was relaxing in bed and open to a visit. Patient is single and lives at the [facility] and has family that lives out of town and rarely sees. Patient believes in God but is unable to attend Denominational. 07/04/23 1534   Encounter Summary   Encounter Overview/Reason  Initial Encounter   Service Provided For: Patient   Referral/Consult From: [address] [place] [address] [place]   Last Encounter  07/04/23   Complexity of Encounter Low   Begin Time 1124   End Time  1142   Total Time Calculated 18 min   Spiritual/Emotional needs   Type Spiritual Support   Assessment/Intervention/Outcome   Assessment Calm;Coping; Hopeful   Intervention Active listening;Prayer (assurance of)/[place]   Outcome Acceptance;Encouraged;Engaged in conversation;Expressed Gratitude;Receptive   Plan and Referrals   Plan/Referrals Continue to visit, (comment)       Intervention: Engaged in conversation and active listening. Prayed with Patient. Outcome: Patient expressed appreciation for visit and offer of continued prayer. Plan: Chaplains are available on site or on call 24/7 for spiritual and emotional support.     Electronically signed by Tasha Reynoso on 7/4/2023 at 3:35 PM

## 2023-07-04 NOTE — CONSULTS
Lyubov Vale is a 72 y.o. female      CC:    Cellulitis LE    HISTORY OF PRESENT ILLNESS:    Pt is 73 yo F presents with bilateral LE cellulitis with increasing redness and pain. Morbid obesity  Admitted started on IV abx. Review of Systems:    General:  Fever: Negative  Weight Change:Negative  Night Sweats: Negative    Eye:  Blurry Vision:Negative  Double Vision: Negative    Ent:  Headaches: Negative  Sore throat: Negative    Allergy/Immunology:  Hives: Negative  Latex allergy: Negative    Hematology/Lymphatic:  Bleeding Problems: Negative  Blood Clots: Negative  Swollen Lymph Nodes: Negative    Lungs:  Cough: Negative  SOB: Negative  Wheezing:Negative    Cardiovascular:  Chest Pain: Negative  Palpitations:Negative    GI:   Decreased Appetite: Negative  Heartburn: Negative  Dysphagia: Negative  Nausea/Vomiting: Negative  Abdominal Pain: Negative  Change in Bowels:Negative  Constipation: Negative  Diarrhea: Negative  Rectal Bleeding: Negative    :   Dysuria: Negative  Increase Urinary Frequency/Urgency: Negative    Neuro:  Seizures: Negative  Confusion: Negative        PAST MEDICAL HISTORY:      History reviewed. No pertinent family history.   Social History     Socioeconomic History    Marital status: Single     Spouse name: Not on file    Number of children: Not on file    Years of education: Not on file    Highest education level: Not on file   Occupational History    Not on file   Tobacco Use    Smoking status: Never    Smokeless tobacco: Never   Vaping Use    Vaping Use: Never used   Substance and Sexual Activity    Alcohol use: Never    Drug use: Never    Sexual activity: Not on file   Other Topics Concern    Not on file   Social History Narrative    Not on file     Social Determinants of Health     Financial Resource Strain: Not on file   Food Insecurity: Not on file   Transportation Needs: Not on file   Physical Activity: Not on file   Stress: Not on file   Social Connections: Not on file

## 2023-07-05 ENCOUNTER — APPOINTMENT (OUTPATIENT)
Dept: NON INVASIVE DIAGNOSTICS | Age: 73
DRG: 603 | End: 2023-07-05
Payer: MEDICARE

## 2023-07-05 LAB
ANION GAP SERPL CALCULATED.3IONS-SCNC: 9 MMOL/L (ref 9–17)
BASOPHILS # BLD: 0.03 K/UL (ref 0–0.2)
BASOPHILS NFR BLD: 0 % (ref 0–2)
BUN SERPL-MCNC: 13 MG/DL (ref 8–23)
BUN/CREAT SERPL: 22 (ref 9–20)
CALCIUM SERPL-MCNC: 9.2 MG/DL (ref 8.6–10.4)
CHLORIDE SERPL-SCNC: 103 MMOL/L (ref 98–107)
CO2 SERPL-SCNC: 28 MMOL/L (ref 20–31)
CREAT SERPL-MCNC: 0.58 MG/DL (ref 0.5–0.9)
EKG ATRIAL RATE: 71 BPM
EKG Q-T INTERVAL: 534 MS
EKG QRS DURATION: 174 MS
EKG QTC CALCULATION (BAZETT): 580 MS
EKG R AXIS: -77 DEGREES
EKG T AXIS: 89 DEGREES
EKG VENTRICULAR RATE: 71 BPM
EOSINOPHIL # BLD: 0.22 K/UL (ref 0–0.44)
EOSINOPHILS RELATIVE PERCENT: 3 % (ref 1–4)
ERYTHROCYTE [DISTWIDTH] IN BLOOD BY AUTOMATED COUNT: 14.1 % (ref 11.8–14.4)
GFR SERPL CREATININE-BSD FRML MDRD: >60 ML/MIN/1.73M2
GLUCOSE SERPL-MCNC: 99 MG/DL (ref 70–99)
HCT VFR BLD AUTO: 38.4 % (ref 36.3–47.1)
HGB BLD-MCNC: 12.3 G/DL (ref 11.9–15.1)
IMM GRANULOCYTES # BLD AUTO: 0.09 K/UL (ref 0–0.3)
IMM GRANULOCYTES NFR BLD: 1 %
LV EF: 60 %
LVEF MODALITY: NORMAL
LYMPHOCYTES # BLD: 12 % (ref 24–43)
LYMPHOCYTES NFR BLD: 0.8 K/UL (ref 1.1–3.7)
MCH RBC QN AUTO: 29.1 PG (ref 25.2–33.5)
MCHC RBC AUTO-ENTMCNC: 32 G/DL (ref 25.2–33.5)
MCV RBC AUTO: 90.8 FL (ref 82.6–102.9)
MONOCYTES NFR BLD: 0.66 K/UL (ref 0.1–1.2)
MONOCYTES NFR BLD: 10 % (ref 3–12)
NEUTROPHILS NFR BLD: 73 % (ref 36–65)
NEUTS SEG NFR BLD: 4.94 K/UL (ref 1.5–8.1)
NRBC BLD-RTO: 0 PER 100 WBC
PLATELET # BLD AUTO: ABNORMAL K/UL (ref 138–453)
PLATELET, FLUORESCENCE: 145 K/UL (ref 138–453)
PLATELETS.RETICULATED NFR BLD AUTO: 4.3 % (ref 1.1–10.3)
POTASSIUM SERPL-SCNC: 4.1 MMOL/L (ref 3.7–5.3)
RBC # BLD AUTO: 4.23 M/UL (ref 3.95–5.11)
SODIUM SERPL-SCNC: 140 MMOL/L (ref 135–144)
WBC OTHER # BLD: 6.7 K/UL (ref 3.5–11.3)

## 2023-07-05 PROCEDURE — 99222 1ST HOSP IP/OBS MODERATE 55: CPT | Performed by: INTERNAL MEDICINE

## 2023-07-05 PROCEDURE — 6370000000 HC RX 637 (ALT 250 FOR IP): Performed by: INTERNAL MEDICINE

## 2023-07-05 PROCEDURE — 36415 COLL VENOUS BLD VENIPUNCTURE: CPT

## 2023-07-05 PROCEDURE — 93306 TTE W/DOPPLER COMPLETE: CPT

## 2023-07-05 PROCEDURE — 2060000000 HC ICU INTERMEDIATE R&B

## 2023-07-05 PROCEDURE — 97161 PT EVAL LOW COMPLEX 20 MIN: CPT | Performed by: PHYSICAL THERAPIST

## 2023-07-05 PROCEDURE — 80048 BASIC METABOLIC PNL TOTAL CA: CPT

## 2023-07-05 PROCEDURE — 6360000002 HC RX W HCPCS: Performed by: INTERNAL MEDICINE

## 2023-07-05 PROCEDURE — 94760 N-INVAS EAR/PLS OXIMETRY 1: CPT

## 2023-07-05 PROCEDURE — 94640 AIRWAY INHALATION TREATMENT: CPT

## 2023-07-05 PROCEDURE — 2580000003 HC RX 258: Performed by: INTERNAL MEDICINE

## 2023-07-05 PROCEDURE — 99231 SBSQ HOSP IP/OBS SF/LOW 25: CPT | Performed by: SURGERY

## 2023-07-05 PROCEDURE — 85027 COMPLETE CBC AUTOMATED: CPT

## 2023-07-05 PROCEDURE — 99231 SBSQ HOSP IP/OBS SF/LOW 25: CPT | Performed by: INTERNAL MEDICINE

## 2023-07-05 RX ORDER — CARVEDILOL 3.12 MG/1
6.25 TABLET ORAL 2 TIMES DAILY WITH MEALS
Status: DISCONTINUED | OUTPATIENT
Start: 2023-07-05 | End: 2023-07-06 | Stop reason: HOSPADM

## 2023-07-05 RX ADMIN — SODIUM CHLORIDE, PRESERVATIVE FREE 10 ML: 5 INJECTION INTRAVENOUS at 08:41

## 2023-07-05 RX ADMIN — GABAPENTIN 300 MG: 300 CAPSULE ORAL at 15:23

## 2023-07-05 RX ADMIN — MICONAZOLE NITRATE: 20 POWDER TOPICAL at 08:29

## 2023-07-05 RX ADMIN — CEFAZOLIN 1000 MG: 1 INJECTION, POWDER, FOR SOLUTION INTRAMUSCULAR; INTRAVENOUS at 08:28

## 2023-07-05 RX ADMIN — GABAPENTIN 300 MG: 300 CAPSULE ORAL at 21:17

## 2023-07-05 RX ADMIN — SODIUM CHLORIDE: 9 INJECTION, SOLUTION INTRAVENOUS at 00:01

## 2023-07-05 RX ADMIN — CARVEDILOL 6.25 MG: 3.12 TABLET, FILM COATED ORAL at 11:52

## 2023-07-05 RX ADMIN — SODIUM CHLORIDE: 9 INJECTION, SOLUTION INTRAVENOUS at 08:28

## 2023-07-05 RX ADMIN — GABAPENTIN 300 MG: 300 CAPSULE ORAL at 08:29

## 2023-07-05 RX ADMIN — CETIRIZINE HYDROCHLORIDE 10 MG: 5 TABLET, FILM COATED ORAL at 08:29

## 2023-07-05 RX ADMIN — APIXABAN 2.5 MG: 2.5 TABLET, FILM COATED ORAL at 08:30

## 2023-07-05 RX ADMIN — MOMETASONE FUROATE AND FORMOTEROL FUMARATE DIHYDRATE 2 PUFF: 200; 5 AEROSOL RESPIRATORY (INHALATION) at 07:38

## 2023-07-05 RX ADMIN — BUMETANIDE 2 MG: 1 TABLET ORAL at 08:29

## 2023-07-05 RX ADMIN — SODIUM CHLORIDE, PRESERVATIVE FREE 10 ML: 5 INJECTION INTRAVENOUS at 08:30

## 2023-07-05 RX ADMIN — MOMETASONE FUROATE AND FORMOTEROL FUMARATE DIHYDRATE 2 PUFF: 200; 5 AEROSOL RESPIRATORY (INHALATION) at 20:08

## 2023-07-05 RX ADMIN — APIXABAN 5 MG: 5 TABLET, FILM COATED ORAL at 21:17

## 2023-07-05 RX ADMIN — CEFAZOLIN 1000 MG: 1 INJECTION, POWDER, FOR SOLUTION INTRAMUSCULAR; INTRAVENOUS at 00:02

## 2023-07-05 RX ADMIN — CARVEDILOL 6.25 MG: 3.12 TABLET, FILM COATED ORAL at 17:03

## 2023-07-05 RX ADMIN — CEFAZOLIN 1000 MG: 1 INJECTION, POWDER, FOR SOLUTION INTRAMUSCULAR; INTRAVENOUS at 16:08

## 2023-07-05 NOTE — PROGRESS NOTES
Hospitalist Progress Note    Patient:  Anita Abdi     YOB: 1950    MRN: [id number]   Admit date: 7/3/2023     Acct: [de-identified]     PCP: Yane Mora MD    CC--Interval History: LLE cellulitis--on Ancef IV    Sepsis ruled out--7. 3.2023    CHF---likely diastolic---pulmonary HTN---IVC increased diameter and impaired or no inspiratory variation    Atrial fibrillation-alternating with PM---chronic    Morbid obesity---multiple assist required    Dementia    See note below     All other ROS negative except noted in HPI    Diet:  Diet NPO Exceptions are: Sips of Water with Meds    Medications:  Scheduled Meds:   apixaban  5 mg Oral BID    carvedilol  6.25 mg Oral BID WC    sodium chloride flush  10 mL IntraVENous 2 times per day    mometasone-formoterol  2 puff Inhalation BID    bumetanide  2 mg Oral Daily    gabapentin  300 mg Oral TID    cetirizine  10 mg Oral Daily    miconazole   Topical BID    ceFAZolin  1,000 mg IntraVENous Q8H     Continuous Infusions:   sodium chloride 30 mL/hr at 07/05/23 0828     PRN Meds:sodium chloride flush, sodium chloride, acetaminophen, sodium chloride nebulizer, albuterol, ondansetron, HYDROcodone 5 mg - acetaminophen    Objective:  Labs:  CBC with Differential:    Lab Results   Component Value Date/Time    WBC 6.7 07/05/2023 05:48 AM    RBC 4.23 07/05/2023 05:48 AM    HGB 12.3 07/05/2023 05:48 AM    HCT 38.4 07/05/2023 05:48 AM    PLT See Reflexed IPF Result 07/05/2023 05:48 AM    MCV 90.8 07/05/2023 05:48 AM    MCH 29.1 07/05/2023 05:48 AM    MCHC 32.0 07/05/2023 05:48 AM    RDW 14.1 07/05/2023 05:48 AM    LYMPHOPCT 12 07/05/2023 05:48 AM    MONOPCT 10 07/05/2023 05:48 AM    BASOPCT 0 07/05/2023 05:48 AM    MONOSABS 0.66 07/05/2023 05:48 AM    LYMPHSABS 0.80 07/05/2023 05:48 AM    EOSABS 0.22 07/05/2023 05:48 AM    BASOSABS 0.03 07/05/2023 05:48 AM     BMP:    Lab Results   Component Value Date/Time     07/05/2023 05:48 AM    K 4.1 07/05/2023 05:48 AM    CL

## 2023-07-05 NOTE — PROGRESS NOTES
Comprehensive Nutrition Assessment    Type and Reason for Visit:  Initial    Nutrition Recommendations/Plan:   NPO advance diet and supplements for wound healing and skin integrity when medically appropriate   Monitor labs as they become available   Monitor skin integrity/weights      Malnutrition Assessment:  Malnutrition Status: At risk for malnutrition (Comment) (related to age, dx dementia, MRDD, excessive calorie intakes showing morbid obesity. multiple wounds noted) (07/05/23 1017)    Context:  Acute Illness     Findings of the 6 clinical characteristics of malnutrition:  Energy Intake:  No significant decrease in energy intake  Weight Loss:  No significant weight loss     Body Fat Loss:  No significant body fat loss     Muscle Mass Loss:  No significant muscle mass loss    Fluid Accumulation:  No significant fluid accumulation     Strength:  Not Performed    Nutrition Assessment:    Seen for multiple wounds, admitted for cellulitis of left lower extremity currently NPO for surgery intervention. Recommend to add protein supplement when diet advances to aid in wound healing. Patient is noted to be morbidly obese. Noted to be MRDD with dementia education not indicated d/t diagnosis. Nutrition Related Findings:    BM 7/4 active sounds. EDEMA RUE non-pitting, LUE non-pitting, RLE +1 pitting, LLE +1 pitting. Wound Type: Multiple (Pretibial left wound, Pretibial right wound, stage 1 and 2 bilateral buttock wounds noted.)       Current Nutrition Intake & Therapies:    Average Meal Intake: NPO  Average Supplements Intake: NPO  Diet NPO Exceptions are: Sips of Water with Meds    Anthropometric Measures:  Height: 5' 3\" (160 cm)  Ideal Body Weight (IBW): 115 lbs (52 kg)       Current Body Weight: 407 lb (184.6 kg), 353.9 % IBW.  Weight Source: Bed Scale  Current BMI (kg/m2): 72.1        Weight Adjustment For: No Adjustment                 BMI Categories: Obese Class 3 (BMI 40.0 or greater)    Estimated Daily

## 2023-07-05 NOTE — CONSULTS
[facility] Cardiology Cardiology    Consult                        Today's Date: 7/5/2023  Patient Name: Maribel Son  Date of admission: 7/3/2023 12:20 PM  Patient's age: 72 y.o., 1950  Admission Dx: Cellulitis of left lower extremity [L03.116]    Reason for Consult:  Cardiac evaluation    Requesting Physician: Sanchez Brito MD    CHIEF COMPLAINT:  pain in the left lef    History Obtained From:  patient, electronic medical record    HISTORY OF PRESENT ILLNESS:      The patient is a 72 y.o.  female who presented with left lower extremity pain, redness and ulceration. Patient denies any chest pain or dyspnea. Patient has history of PAF/SSSS and has PPM. Patient was seeing Dr. Jeremiah Meraz and was last seen in his office 11/8/21. She is resident of nursing home and uses walker for ambulation. She denies any falls or bleeding. Past Medical History:   has a past medical history of Longstanding persistent atrial fibrillation ([address]) and S/P placement of cardiac pacemaker. Past Surgical History:   has no past surgical history on file. Home Medications:    Prior to Admission medications    Medication Sig Start Date End Date Taking?  Authorizing Provider   miconazole (MICOTIN) 2 % powder Apply 2 % topically in the morning and at bedtime 6/12/23  Yes Historical Provider, MD   ondansetron (ZOFRAN) 4 MG tablet Take 1 tablet by mouth every 6 hours as needed for Nausea or Vomiting    Historical Provider, MD   loratadine (CLARITIN) 10 MG tablet Take 1 tablet by mouth daily    Historical Provider, MD   hydrOXYzine HCl (ATARAX) 25 MG tablet Take 1 tablet by mouth nightly    Historical Provider, MD   hydrOXYzine HCl (ATARAX) 25 MG tablet Take 1 tablet by mouth every 6 hours as needed for Itching    Historical Provider, MD   budesonide-formoterol (SYMBICORT) 160-4.5 MCG/ACT AERO Inhale 2 puffs into the lungs 2 times daily    Historical Provider, MD   gabapentin (NEURONTIN) 300 MG capsule Take 1 capsule by mouth 3

## 2023-07-05 NOTE — CARE COORDINATION
Case Management Assessment  Initial Evaluation    Date/Time of Evaluation: 7/5/2023 9:39 AM  Assessment Completed by: Demian Ayers RN    If patient is discharged prior to next notation, then this note serves as note for discharge by case management. Patient Name: Crystal Rhodes                   YOB: 1950  Diagnosis: Cellulitis of left lower extremity [L03.116]                   Date / Time: 7/3/2023 12:20 PM    Patient Admission Status: Inpatient   Readmission Risk (Low < 19, Mod (19-27), High > 27): Readmission Risk Score: 12.5    Current PCP: Cyn Solano MD  PCP verified by CM? Yes    Chart Reviewed: Yes      History Provided by: Medical Record, Patient  Patient Orientation: Alert and Oriented    Patient Cognition: Alert    Hospitalization in the last 30 days (Readmission):  No    If yes, Readmission Assessment in  Navigator will be completed. Advance Directives:      Code Status: Full Code   Patient's Primary Decision Maker is: Legal Next of Kin      Discharge Planning:    Patient lives with: Other (Comment) Type of Home: [address] [facility]  Primary Care Giver: Other (Comment)  Patient Support Systems include: Other (Comment)   Current Financial resources: Medicaid, Medicare  Current community resources: ECF/Home Care  Current services prior to admission: [address]            Current DME:              Type of Home Care services:  None    ADLS  Prior functional level: Assistance with the following:, Bathing, Dressing, Toileting, Mobility  Current functional level: Assistance with the following:, Bathing, Dressing, Toileting, Mobility    PT AM-PAC:   /24  OT AM-PAC:   /24    Family can provide assistance at DC: No  Would you like Case Management to discuss the discharge plan with any other family members/significant others, and if so, who?  No  Plans to Return to Present Housing: Yes  Other Identified Issues/Barriers to RETURNING to current housing:

## 2023-07-05 NOTE — PROGRESS NOTES
Physical Therapy  Facility/Department: [address] [place] [address]  Physical Therapy Initial Assessment    Name: Tramaine Jackson  : 1950  MRN: [id number]  Date of Service: 2023    Discharge Recommendations:  [address] [place] [address] with PT          Patient Diagnosis(es): The encounter diagnosis was Cellulitis of left lower extremity. Past Medical History:  has a past medical history of Longstanding persistent atrial fibrillation ([address]) and S/P placement of cardiac pacemaker. Past Surgical History:  has no past surgical history on file. Assessment   Body Structures, Functions, Activity Limitations Requiring Skilled Therapeutic Intervention: Decreased functional mobility ; Decreased body mechanics; Decreased endurance  Therapy Prognosis: Fair  Decision Making: Medium Complexity  Activity Tolerance  Activity Tolerance: Treatment limited secondary to medical complications; Patient limited by fatigue     Plan   Physcial Therapy Plan  General Plan: 5-7 times per week  Current Treatment Recommendations: Transfer training, Functional mobility training, Gait training  Safety Devices  Type of Devices: Left in bed, Call light within reach     Restrictions        Subjective   General  Chart Reviewed: Yes  Patient assessed for rehabilitation services?: Yes  Response To Previous Treatment: Not applicable  Family / Caregiver Present: No  Follows Commands: Within Functional Limits         Social/Functional History  Social/Functional History  Lives With: Other (comment)  Type of Home: Facility  Home Layout: One level  Bathroom Toilet: Standard  Home Equipment: Walker, standard  Receives Help From: Other (comment)  ADL Assistance: Needs assistance  Toileting: Needs assistance  Homemaking Assistance: Needs assistance  Ambulation Assistance: Needs assistance  Transfer Assistance: Needs assistance  Active : No  Occupation: Retired  IADL Comments: ECF resident.  Has been able to walk with RW approx 12-15 ft to get to restroom, with

## 2023-07-05 NOTE — CARE COORDINATION
DISCHARGE BARRIERS       Reason for Referral:  SW completed a Psychosocial Assessment for evaluation of patient's mental health, social status, and functional capacity within the community. Jose Guadalupe Ayala is a 72 y.o. female admitted due to Cellulitis of left lower extremity. Patient alone. SW provided supportive listening while patient discussed past medical history and events leading up to hospitalization. Mental Status:  Alert, oriented, and engaging during assessment. Decision Making:  Makes own decisions. Family/Social/Home Environment: lives in a skilled nursing facility    Support: Discussed a good social support network     Current Services:  [address]     Current DMEs: [employer] [facility]    PCP: Fabi Landaverde MD and repots no issues affording medication.  status:   None     ADLs and means of transportation: Assisted in ADLs prior to hospitalization and unable to transport self. Food insecurity or needed financial assistance: Denies any food insecurity or financial concerns at this time. ACP and Code Status:  SW discussed an Advance Directive which included the patient's choices for care and treatment in the case of a health event that adversely affects decision-making abilities. SW provided education and resources. Jose Guadalupe Ayala has no questions at this time and has agreed to keep me up-to-date should anything change. Jose Guadalupe Ayala is a Full Code status and has NO advanced directive - not interested in additional information. Collaborative List of SNF/ECF/HH were provided: offered, declined. Patient states she will be  No discharge order at this time. Anticipated Needs/Discharge Plan:  Spoke with patient/family/representative about discharge plan. Patient/Family/Representative verbalizes understanding of the plan of care and denies discharge needs or further services at this time. SW provided business card.  SW will continue to monitor needs and

## 2023-07-06 VITALS
BODY MASS INDEX: 51.91 KG/M2 | OXYGEN SATURATION: 95 % | HEIGHT: 63 IN | HEART RATE: 77 BPM | RESPIRATION RATE: 24 BRPM | TEMPERATURE: 98.9 F | DIASTOLIC BLOOD PRESSURE: 98 MMHG | SYSTOLIC BLOOD PRESSURE: 138 MMHG | WEIGHT: 293 LBS

## 2023-07-06 LAB
ANION GAP SERPL CALCULATED.3IONS-SCNC: 10 MMOL/L (ref 9–17)
BASOPHILS # BLD: 0.03 K/UL (ref 0–0.2)
BASOPHILS NFR BLD: 1 % (ref 0–2)
BUN SERPL-MCNC: 13 MG/DL (ref 8–23)
BUN/CREAT SERPL: 20 (ref 9–20)
CALCIUM SERPL-MCNC: 9.2 MG/DL (ref 8.6–10.4)
CHLORIDE SERPL-SCNC: 101 MMOL/L (ref 98–107)
CO2 SERPL-SCNC: 30 MMOL/L (ref 20–31)
CREAT SERPL-MCNC: 0.65 MG/DL (ref 0.5–0.9)
EOSINOPHIL # BLD: 0.25 K/UL (ref 0–0.44)
EOSINOPHILS RELATIVE PERCENT: 4 % (ref 1–4)
ERYTHROCYTE [DISTWIDTH] IN BLOOD BY AUTOMATED COUNT: 13.7 % (ref 11.8–14.4)
GFR SERPL CREATININE-BSD FRML MDRD: >60 ML/MIN/1.73M2
GLUCOSE SERPL-MCNC: 90 MG/DL (ref 70–99)
HCT VFR BLD AUTO: 40.3 % (ref 36.3–47.1)
HGB BLD-MCNC: 13.1 G/DL (ref 11.9–15.1)
IMM GRANULOCYTES # BLD AUTO: 0.12 K/UL (ref 0–0.3)
IMM GRANULOCYTES NFR BLD: 2 %
LYMPHOCYTES # BLD: 11 % (ref 24–43)
LYMPHOCYTES NFR BLD: 0.72 K/UL (ref 1.1–3.7)
MCH RBC QN AUTO: 29.3 PG (ref 25.2–33.5)
MCHC RBC AUTO-ENTMCNC: 32.5 G/DL (ref 25.2–33.5)
MCV RBC AUTO: 90.2 FL (ref 82.6–102.9)
MONOCYTES NFR BLD: 0.57 K/UL (ref 0.1–1.2)
MONOCYTES NFR BLD: 9 % (ref 3–12)
NEUTROPHILS NFR BLD: 73 % (ref 36–65)
NEUTS SEG NFR BLD: 4.68 K/UL (ref 1.5–8.1)
NRBC BLD-RTO: 0 PER 100 WBC
PLATELET # BLD AUTO: 171 K/UL (ref 138–453)
PMV BLD AUTO: 10.1 FL (ref 8.1–13.5)
POTASSIUM SERPL-SCNC: 3.8 MMOL/L (ref 3.7–5.3)
RBC # BLD AUTO: 4.47 M/UL (ref 3.95–5.11)
SODIUM SERPL-SCNC: 141 MMOL/L (ref 135–144)
WBC OTHER # BLD: 6.4 K/UL (ref 3.5–11.3)

## 2023-07-06 PROCEDURE — 2580000003 HC RX 258: Performed by: INTERNAL MEDICINE

## 2023-07-06 PROCEDURE — 6370000000 HC RX 637 (ALT 250 FOR IP): Performed by: INTERNAL MEDICINE

## 2023-07-06 PROCEDURE — 94640 AIRWAY INHALATION TREATMENT: CPT

## 2023-07-06 PROCEDURE — 36415 COLL VENOUS BLD VENIPUNCTURE: CPT

## 2023-07-06 PROCEDURE — 97530 THERAPEUTIC ACTIVITIES: CPT

## 2023-07-06 PROCEDURE — 85027 COMPLETE CBC AUTOMATED: CPT

## 2023-07-06 PROCEDURE — 99238 HOSP IP/OBS DSCHRG MGMT 30/<: CPT | Performed by: INTERNAL MEDICINE

## 2023-07-06 PROCEDURE — 6360000002 HC RX W HCPCS: Performed by: INTERNAL MEDICINE

## 2023-07-06 PROCEDURE — 94761 N-INVAS EAR/PLS OXIMETRY MLT: CPT

## 2023-07-06 PROCEDURE — 80048 BASIC METABOLIC PNL TOTAL CA: CPT

## 2023-07-06 PROCEDURE — 97110 THERAPEUTIC EXERCISES: CPT

## 2023-07-06 RX ORDER — CEPHALEXIN 500 MG/1
500 CAPSULE ORAL 4 TIMES DAILY
Qty: 16 CAPSULE | Refills: 0
Start: 2023-07-06 | End: 2023-07-10

## 2023-07-06 RX ORDER — ACETAMINOPHEN 325 MG/1
650 TABLET ORAL EVERY 4 HOURS PRN
Qty: 120 TABLET | Refills: 0 | COMMUNITY
Start: 2023-07-06

## 2023-07-06 RX ORDER — HYDROCODONE BITARTRATE AND ACETAMINOPHEN 5; 325 MG/1; MG/1
1 TABLET ORAL EVERY 6 HOURS PRN
Qty: 12 TABLET | Refills: 0 | Status: SHIPPED | OUTPATIENT
Start: 2023-07-06 | End: 2023-07-11 | Stop reason: SDUPTHER

## 2023-07-06 RX ORDER — ZINC OXIDE 13 %
1 CREAM (GRAM) TOPICAL 3 TIMES DAILY
Qty: 30 CAPSULE | Refills: 0
Start: 2023-07-06 | End: 2023-07-16

## 2023-07-06 RX ORDER — CARVEDILOL 6.25 MG/1
6.25 TABLET ORAL 2 TIMES DAILY WITH MEALS
Qty: 60 TABLET | Refills: 0
Start: 2023-07-06

## 2023-07-06 RX ADMIN — SODIUM CHLORIDE 25 ML/HR: 9 INJECTION, SOLUTION INTRAVENOUS at 08:16

## 2023-07-06 RX ADMIN — BUMETANIDE 2 MG: 1 TABLET ORAL at 08:21

## 2023-07-06 RX ADMIN — APIXABAN 5 MG: 5 TABLET, FILM COATED ORAL at 08:21

## 2023-07-06 RX ADMIN — CEFAZOLIN 1000 MG: 1 INJECTION, POWDER, FOR SOLUTION INTRAMUSCULAR; INTRAVENOUS at 00:17

## 2023-07-06 RX ADMIN — GABAPENTIN 300 MG: 300 CAPSULE ORAL at 13:41

## 2023-07-06 RX ADMIN — MOMETASONE FUROATE AND FORMOTEROL FUMARATE DIHYDRATE 2 PUFF: 200; 5 AEROSOL RESPIRATORY (INHALATION) at 07:29

## 2023-07-06 RX ADMIN — SODIUM CHLORIDE, PRESERVATIVE FREE 10 ML: 5 INJECTION INTRAVENOUS at 08:21

## 2023-07-06 RX ADMIN — GABAPENTIN 300 MG: 300 CAPSULE ORAL at 20:28

## 2023-07-06 RX ADMIN — CEFAZOLIN 1000 MG: 1 INJECTION, POWDER, FOR SOLUTION INTRAMUSCULAR; INTRAVENOUS at 08:17

## 2023-07-06 RX ADMIN — CETIRIZINE HYDROCHLORIDE 10 MG: 5 TABLET, FILM COATED ORAL at 08:21

## 2023-07-06 RX ADMIN — SODIUM CHLORIDE, PRESERVATIVE FREE 10 ML: 5 INJECTION INTRAVENOUS at 00:51

## 2023-07-06 RX ADMIN — CARVEDILOL 6.25 MG: 3.12 TABLET, FILM COATED ORAL at 17:23

## 2023-07-06 RX ADMIN — MICONAZOLE NITRATE: 20 POWDER TOPICAL at 20:27

## 2023-07-06 RX ADMIN — MICONAZOLE NITRATE: 20 POWDER TOPICAL at 08:23

## 2023-07-06 RX ADMIN — APIXABAN 5 MG: 5 TABLET, FILM COATED ORAL at 20:28

## 2023-07-06 RX ADMIN — CARVEDILOL 6.25 MG: 3.12 TABLET, FILM COATED ORAL at 08:21

## 2023-07-06 RX ADMIN — GABAPENTIN 300 MG: 300 CAPSULE ORAL at 08:21

## 2023-07-06 NOTE — PLAN OF CARE
Problem: Discharge Planning  Goal: Discharge to home or other facility with appropriate resources  Outcome: Progressing  Flowsheets (Taken 7/6/2023 0824)  Discharge to home or other facility with appropriate resources: Identify barriers to discharge with patient and caregiver     Problem: Pain  Goal: Verbalizes/displays adequate comfort level or baseline comfort level  Outcome: Progressing     Problem: Safety - Adult  Goal: Free from fall injury  Outcome: Progressing     Problem: ABCDS Injury Assessment  Goal: Absence of physical injury  Outcome: Progressing     Problem: Skin/Tissue Integrity  Goal: Absence of new skin breakdown  Description: 1. Monitor for areas of redness and/or skin breakdown  2. Assess vascular access sites hourly  3. Every 4-6 hours minimum:  Change oxygen saturation probe site  4. Every 4-6 hours:  If on nasal continuous positive airway pressure, respiratory therapy assess nares and determine need for appliance change or resting period.   Outcome: Progressing     Problem: Cardiovascular - Adult  Goal: Maintains optimal cardiac output and hemodynamic stability  Outcome: Progressing  Goal: Absence of cardiac dysrhythmias or at baseline  Outcome: Progressing     Problem: Skin/Tissue Integrity - Adult  Goal: Skin integrity remains intact  Outcome: Progressing  Flowsheets (Taken 7/6/2023 0824)  Skin Integrity Remains Intact: Monitor for areas of redness and/or skin breakdown  Goal: Incisions, wounds, or drain sites healing without S/S of infection  Outcome: Progressing  Flowsheets (Taken 7/6/2023 0824)  Incisions, Wounds, or Drain Sites Healing Without Sign and Symptoms of Infection: ADMISSION and DAILY: Assess and document risk factors for pressure ulcer development     Problem: Musculoskeletal - Adult  Goal: Return mobility to safest level of function  Outcome: Progressing     Problem: Genitourinary - Adult  Goal: Absence of urinary retention  Outcome: Progressing     Problem: Infection -

## 2023-07-06 NOTE — PROGRESS NOTES
rounding on PCU. Assessment: Patient asked the  to contact her Muslim, but could not come up with a name for the Muslim. She says she has no family in this area. Intervention: Engaged in conversation and prayer. Patient expressed gratitude for visit and offer of continued prayer. Plan: Chaplains are available 24/7 to help with spiritual and emotional concerns.         07/06/23 6172   Encounter Summary   Encounter Overview/Reason  Volunteer Encounter   Service Provided For: Patient   Referral/Consult From: [address] [place] [address] [place]   Last Encounter  07/06/23   Complexity of Encounter Low   Begin Time 0757   End Time  0811   Total Time Calculated 14 min   Spiritual/Emotional needs   Type Spiritual Support   Assessment/Intervention/Outcome   Assessment Calm   Intervention Active listening;Prayer (assurance of)/[place];Sustaining Presence/Ministry of presence   Outcome Expressed Gratitude

## 2023-07-06 NOTE — PROGRESS NOTES
Physician Progress Note      PATIENT:               Tab Adan  CSN #:                  [id number]  :                       1950  ADMIT DATE:       7/3/2023 12:20 PM  [facility] DATE:  RESPONDING  PROVIDER #:        Salvador Graham MD          QUERY TEXT:    Patient admitted with LLE Cellulitis, noted to have \"Longstanding persistent   atrial fibrillation\" in H&P,  and is maintained on Eliquis. If possible,   please document in progress notes and discharge summary if you are evaluating   and/or treating any of the following: The medical record reflects the following:  Risk Factors: 21-year-old female, CHF  Clinical Indicators: H&P,  \" Longstanding persistent atrial fibrillation;   Atrial fibrillation---PM---rate control---cont'd Eliquis\". Treatment: Eliquis, EKG,    Query  By Nitesh Christy  . Options provided:  -- Secondary hypercoagulable state in a patient with atrial fibrillation  -- Other - I will add my own diagnosis  -- Disagree - Not applicable / Not valid  -- Disagree - Clinically unable to determine / Unknown  -- Refer to Clinical Documentation Reviewer    PROVIDER RESPONSE TEXT:    This patient has secondary hypercoagulable state in a patient with atrial   fibrillation.     Query created by: Cristopher Gutierrez on 2023 8:15 AM      Electronically signed by:  Salvador Graham MD 2023 1:14 PM

## 2023-07-06 NOTE — DISCHARGE SUMMARY
[address]                 [facility], [address]                               DISCHARGE SUMMARY    PATIENT NAME: Meka Beltrán                   :        1950  MED REC NO:   [id number]                             ROOM:       [id number]  ACCOUNT NO:   [de-identified]                           ADMIT DATE: 2023  PROVIDER:     Cathleen Fowler. Beto Hardy MD                  DISCHARGE DATE:  2023    ATTENDING PHYSICIAN OF HOSPITALIZATION:  Anu Garzon MD    PERSONAL PHYSICIAN:  Martha Regan, [facility], [facility]. The patient also seen [facility], [facility]  Consultants. DIAGNOSES:  1. Left lower extremity cellulitis, 2023, left lower leg  swelling, pain, wound was draining anteriorly bubble wrap appearance,  chronic ulceration. Elevated lactic acid level, 2023, 2.4,  subsequently 2.2. Sepsis ruled out, 2023. Congestive heart  failure likely acute on chronic diastolic. Two-D echo, 2023, [place]  severely dilated, LV upper limits of normal, normal left ventricular  systolic function, RA dilatation, RV dilatation, pacing leads present,  mild MR, mild TR, RVSP 51 mmHg, moderate pulmonary hypertension, normal  AR 2.9 cm, IVC increased diameter and impaired or no inspiratory  variation, unable to evaluate diastolic function, LVEF 79%. ProBNP,  2023, 1480. Troponin, 2023, 27. Bilateral lower extremity  edema. 2.  Pulmonary hypertension, moderate. 3.  Atrial fibrillation, alternating with pacemaker. Chest x-ray,  2023, pulmonary vascular congestion, calcified pericardia,  silhouette enlargement. EKG, 2023, atrial fibrillation, rate 74,  frequent ventricular paced rhythms pacemaker, low voltage nonspecific  ST-T changes inferolateral ischemia.   4.  Dysrhythmia, prior history of paroxysmal atrial fibrillation,  second-degree AV block, Mobitz type 1 Wenckebach, AVB

## 2023-07-06 NOTE — DISCHARGE INSTRUCTIONS
Telfa, gauze and ACE wraps to bilateral lower legs. Change daily and as needed.     Follow up with Dr. Camryn Ventura in 1 week    Follow up with Cardiology in 1 week    Follow up with General Surgery (Wound Care)  in 1 week

## 2023-07-06 NOTE — PROGRESS NOTES
Physical Therapy  Facility/Department: [facility]  PROGRESSIVE CARE  Daily Treatment Note  NAME: Meg Smith  : 1950  MRN: [id number]    Date of Service: 2023    Discharge Recommendations:  [address] [place] [address] with PT        Patient Diagnosis(es): The encounter diagnosis was Cellulitis of left lower extremity. Assessment   Activity Tolerance: Treatment limited secondary to medical complications; Patient limited by fatigue;Patient limited by pain     Plan    Physcial Therapy Plan  General Plan: 5-7 times per week  Current Treatment Recommendations: Transfer training;Functional mobility training;Gait training     Restrictions        Subjective    Subjective  Subjective: RN approved session. Time taken for pericare and bed management. Pt greeable to commode use as has had BM in bed. Pain: not rated, pt yelling out in pain with all movement. most pain in LEs when moving. Orientation  Overall Orientation Status: Within Functional Limits     Objective   Vitals  O2 Device: None (Room air)  Bed Mobility Training  Bed Mobility Training: Yes  Overall Level of Assistance: Moderate assistance;Maximum assistance;Assist X2;Other (comment) (3 assist)  Rolling: Moderate assistance;Maximum assistance;Assist X2  Supine to Sit: Moderate assistance;Maximum assistance;Assist X2;Other (comment) (3 person assist with much increased time to complete and breaks taken due to pain)  Sit to Supine:  (nursing staff to return pt into bed after commode use and bathing)  Scooting: Moderate assistance;Assist X2  Balance  Sitting: With support  Sitting - Static: Good (unsupported) (pt with good balance sitting on commode)  Standing: With support  Standing - Static: Fair  Transfer Training  Transfer Training: Yes  Overall Level of Assistance: Moderate assistance;Assist X2  Sit to Stand:  Moderate assistance;Assist X2  Stand to Sit: Moderate assistance (cues for alignment, 2nd person for safety)  Stand Pivot Transfers: Minimum assistance;Assist

## 2023-07-06 NOTE — PROGRESS NOTES
Hospitalist Progress Note    Patient:  Marc Hicks     YOB: 1950    MRN: [id number]   Admit date: 7/3/2023     Acct: [de-identified]     PCP: Mt Jeffery MD    CC--Interval History: LLE cellulitis---improved--on Ancef--to [id number]---[id number]    CHF diastolic--better controlled    Underlying atrial fibrillation alternating with PM rhythm    See note below      All other ROS negative except noted in HPI    Diet:  ADULT DIET;  Regular; Low Fat/Low Chol/High Fiber/2 gm Na    Medications:  Scheduled Meds:   apixaban  5 mg Oral BID    carvedilol  6.25 mg Oral BID WC    sodium chloride flush  10 mL IntraVENous 2 times per day    mometasone-formoterol  2 puff Inhalation BID    bumetanide  2 mg Oral Daily    gabapentin  300 mg Oral TID    cetirizine  10 mg Oral Daily    miconazole   Topical BID    ceFAZolin  1,000 mg IntraVENous Q8H     Continuous Infusions:   sodium chloride Stopped (07/05/23 1608)     PRN Meds:sodium chloride flush, sodium chloride, acetaminophen, sodium chloride nebulizer, albuterol, ondansetron, HYDROcodone 5 mg - acetaminophen    Objective:  Labs:  CBC with Differential:    Lab Results   Component Value Date/Time    WBC 6.4 07/06/2023 05:18 AM    RBC 4.47 07/06/2023 05:18 AM    HGB 13.1 07/06/2023 05:18 AM    HCT 40.3 07/06/2023 05:18 AM     07/06/2023 05:18 AM    MCV 90.2 07/06/2023 05:18 AM    MCH 29.3 07/06/2023 05:18 AM    MCHC 32.5 07/06/2023 05:18 AM    RDW 13.7 07/06/2023 05:18 AM    LYMPHOPCT 11 07/06/2023 05:18 AM    MONOPCT 9 07/06/2023 05:18 AM    BASOPCT 1 07/06/2023 05:18 AM    MONOSABS 0.57 07/06/2023 05:18 AM    LYMPHSABS 0.72 07/06/2023 05:18 AM    EOSABS 0.25 07/06/2023 05:18 AM    BASOSABS 0.03 07/06/2023 05:18 AM     BMP:    Lab Results   Component Value Date/Time     07/06/2023 05:18 AM    K 3.8 07/06/2023 05:18 AM     07/06/2023 05:18 AM    CO2 30 07/06/2023 05:18 AM    BUN 13 07/06/2023 05:18 AM    LABALBU 3.6 07/03/2023 12:50 PM    CREATININE

## 2023-07-06 NOTE — DISCHARGE INSTR - DIET

## 2023-07-06 NOTE — PROGRESS NOTES
Occupational Therapy      Patient declined OT services. Patient discharged per their request. Patient was advised to inform staff if they have any questions and therapy can return at that time. Patient verbalized understanding.

## 2023-07-07 NOTE — PROGRESS NOTES
Patient's left lower leg has a dressing intact. There is a line drawn from the cellulits and the redness has decreased in size from admission. Patient was given her hs meds.

## 2023-07-08 LAB
MICROORGANISM SPEC CULT: NORMAL
MICROORGANISM SPEC CULT: NORMAL
SPECIMEN DESCRIPTION: NORMAL
SPECIMEN DESCRIPTION: NORMAL

## 2023-07-11 ENCOUNTER — OUTSIDE SERVICES (OUTPATIENT)
Dept: FAMILY MEDICINE CLINIC | Age: 73
End: 2023-07-11
Payer: MEDICARE

## 2023-07-11 DIAGNOSIS — G62.9 NEUROPATHY: Primary | ICD-10-CM

## 2023-07-11 DIAGNOSIS — S81.802D OPEN WOUND OF LEFT LOWER LEG, SUBSEQUENT ENCOUNTER: ICD-10-CM

## 2023-07-11 DIAGNOSIS — L97.929 ULCER OF LEFT LOWER EXTREMITY, UNSPECIFIED ULCER STAGE (HCC): ICD-10-CM

## 2023-07-11 DIAGNOSIS — I48.11 LONGSTANDING PERSISTENT ATRIAL FIBRILLATION (HCC): ICD-10-CM

## 2023-07-11 DIAGNOSIS — F03.A0 MILD DEMENTIA WITHOUT BEHAVIORAL DISTURBANCE, PSYCHOTIC DISTURBANCE, MOOD DISTURBANCE, OR ANXIETY, UNSPECIFIED DEMENTIA TYPE (HCC): ICD-10-CM

## 2023-07-11 PROCEDURE — 99309 SBSQ NF CARE MODERATE MDM 30: CPT | Performed by: FAMILY MEDICINE

## 2023-07-11 RX ORDER — HYDROCODONE BITARTRATE AND ACETAMINOPHEN 5; 325 MG/1; MG/1
1 TABLET ORAL EVERY 6 HOURS PRN
Qty: 28 TABLET | Refills: 0 | Status: SHIPPED | OUTPATIENT
Start: 2023-07-11 | End: 2023-07-18

## 2023-07-11 NOTE — TELEPHONE ENCOUNTER
[facility] Amy Stein called requesting a refill of the below medication which has been pended for you:     Requested Prescriptions     Pending Prescriptions Disp Refills    HYDROcodone-acetaminophen (NORCO) 5-325 MG per tablet 12 tablet 0     Sig: Take 1 tablet by mouth every 6 hours as needed for Pain for up to 3 days. Intended supply: 3 days.  Take lowest dose possible to manage pain Max Daily Amount: 4 tablets       Last Appointment Date: resident at [facility]  Next Appointment Date: resident at [facility]    Allergies   Allergen Reactions    Penicillins Rash    Sulfa Antibiotics

## 2023-07-12 ASSESSMENT — ENCOUNTER SYMPTOMS
CHEST TIGHTNESS: 0
ABDOMINAL PAIN: 0
DIARRHEA: 0
COUGH: 1
WHEEZING: 0
CONSTIPATION: 0
SHORTNESS OF BREATH: 0

## 2023-07-12 NOTE — PROGRESS NOTES
Zack Ayers MD, personally performed the services described in this document as transcribed by the , and it is both accurate and complete.     Electronically signed by Erika Zaidi MD on 7/13/23 at 9:42 PM EDT

## 2023-07-13 NOTE — PROGRESS NOTES
Physician Progress Note      PATIENT:               Tobi Olmedo  CSN #:                  [id number]  :                       1950  ADMIT DATE:       7/3/2023 12:20 PM  [address] [place] DATE:        2023 8:49 PM  RESPONDING  PROVIDER #:        Geraldo Appiah MD          QUERY TEXT:    Patient admitted with cellulitis. Noted documentation of \"chronic diastolic   CHF\" by cards c/s on  and DCS states \" Congestive heart failure likely   acute on chronic diastolic\". If possible, please document in progress notes   and discharge summary if you are evaluating and /or treating any of the   following: The medical record reflects the following:  Risk Factors: CHF, chronic illness, morbid obesity, HTN  Clinical Indicators: ED provider documents LE edema, pitting edema, BNP on   admission 1480, CXR 7/3 \"Pulmonary vascular congestion. \", CXR  \"Vascularity   is top normal to mildly congested. \"  Treatment: continued home does of Bumex, 2mg, QD, CXR, cards c/s, labs    Thank you  Kimble Homans, RN, CRCR, CCDS  [email or web address]  Options provided:  -- Acute on chronic diastolic CHF confirmed, chronic ruled out  -- Chronic diastolic CHF confirmed, acute on chronic ruled out  -- Other - I will add my own diagnosis  -- Disagree - Not applicable / Not valid  -- Disagree - Clinically unable to determine / Unknown  -- Refer to Clinical Documentation Reviewer    PROVIDER RESPONSE TEXT:    This pt has Chronic diastolic CHF confirmed, acute on chronic ruled out.     Query created by: Kimble Homans on 2023 7:26 AM      Electronically signed by:  Geraldo Appiah MD 2023 7:31 AM

## 2023-07-20 PROBLEM — L97.912 NON-PRESSURE ULCER OF LOWER EXTREMITY WITH FAT LAYER EXPOSED, RIGHT (HCC): Status: ACTIVE | Noted: 2023-07-20

## 2023-07-20 PROBLEM — L97.922 NON-PRESSURE ULCER OF LEFT LOWER EXTREMITY WITH FAT LAYER EXPOSED (HCC): Status: ACTIVE | Noted: 2023-07-20

## 2023-08-01 ENCOUNTER — OUTSIDE SERVICES (OUTPATIENT)
Dept: FAMILY MEDICINE CLINIC | Age: 73
End: 2023-08-01
Payer: MEDICARE

## 2023-08-01 DIAGNOSIS — M79.89 LEG SWELLING: ICD-10-CM

## 2023-08-01 DIAGNOSIS — F03.A0 MILD DEMENTIA WITHOUT BEHAVIORAL DISTURBANCE, PSYCHOTIC DISTURBANCE, MOOD DISTURBANCE, OR ANXIETY, UNSPECIFIED DEMENTIA TYPE (HCC): ICD-10-CM

## 2023-08-01 DIAGNOSIS — G62.9 NEUROPATHY: ICD-10-CM

## 2023-08-01 DIAGNOSIS — L97.929 ULCER OF LEFT LOWER EXTREMITY, UNSPECIFIED ULCER STAGE (HCC): Primary | ICD-10-CM

## 2023-08-01 PROCEDURE — 99309 SBSQ NF CARE MODERATE MDM 30: CPT | Performed by: FAMILY MEDICINE

## 2023-08-02 ENCOUNTER — TELEPHONE (OUTPATIENT)
Dept: FAMILY MEDICINE CLINIC | Age: 73
End: 2023-08-02

## 2023-08-02 RX ORDER — CEPHALEXIN 500 MG/1
500 CAPSULE ORAL 4 TIMES DAILY
COMMUNITY
Start: 2023-07-27 | End: 2023-08-03

## 2023-08-08 ASSESSMENT — ENCOUNTER SYMPTOMS
CHEST TIGHTNESS: 0
DIARRHEA: 0
WHEEZING: 0
CONSTIPATION: 0
COUGH: 0
ABDOMINAL PAIN: 0
SHORTNESS OF BREATH: 0

## 2023-08-08 NOTE — PROGRESS NOTES
[address]  [address] [facility] [address]  Dept: [phone]  Dept Fax: [phone]  Loc: [phone]    Nick Goddard  is a 72 y.o. female who presents today for her medical conditions/complaints as noted below. Nick Goddard is c/o of     Chief Complaint   Patient presents with    Wound Check    Peripheral Neuropathy    Atrial Fibrillation       HPI:   Montez Gonzalez is being seen for her regular monthly follow up while at [facility]. Overall, patient has been doing very well. She does not have any major concerns today. She has not had any problems with palpitations or funny heartbeats that have been bothering her. Her Afib seems to be well controlled. She denies any problems with abdominal pain. Patient reports that she has a great appetite. No problems with constipation or diarrhea. Patient was in the hospital last week for Cellulitis of her lower legs. She feels like the pain with that is better. She still has pain, but it is not near as bad as it was and her wounds are being dressed regularly. Patient has not had any problems with feeling ill since getting back. No fevers, chills, or body aches. She feels like her breathing has been better. Patient previously was complaining about an ongoing cold with a cough, but this time she said she has not had any cough and no shortness of breath, and overall she feels like things have been a lot better in that regard. Past Medical History:   Diagnosis Date    Longstanding persistent atrial fibrillation ([address]) 12/7/2021    S/P placement of cardiac pacemaker 12/7/2021     No past surgical history on file. No family history on file. Social History     Tobacco Use    Smoking status: Never    Smokeless tobacco: Never   Substance Use Topics    Alcohol use: Never      Prior to Visit Medications    Medication Sig Taking?  Authorizing Provider   acetaminophen (TYLENOL)

## 2023-08-15 ASSESSMENT — ENCOUNTER SYMPTOMS
COUGH: 1
SHORTNESS OF BREATH: 0
CONSTIPATION: 0
DIARRHEA: 0
ABDOMINAL PAIN: 0
WHEEZING: 0
CHEST TIGHTNESS: 0

## 2023-09-05 ENCOUNTER — OUTSIDE SERVICES (OUTPATIENT)
Dept: FAMILY MEDICINE CLINIC | Age: 73
End: 2023-09-05
Payer: MEDICARE

## 2023-09-05 DIAGNOSIS — G62.9 NEUROPATHY: ICD-10-CM

## 2023-09-05 DIAGNOSIS — M79.89 LEG SWELLING: ICD-10-CM

## 2023-09-05 DIAGNOSIS — L97.921 NON-PRESSURE CHRONIC ULCER OF LEFT LOWER LEG, LIMITED TO BREAKDOWN OF SKIN (HCC): Primary | ICD-10-CM

## 2023-09-05 DIAGNOSIS — I48.0 PAF (PAROXYSMAL ATRIAL FIBRILLATION) (HCC): ICD-10-CM

## 2023-09-05 DIAGNOSIS — R06.2 WHEEZING: ICD-10-CM

## 2023-09-05 PROCEDURE — 99309 SBSQ NF CARE MODERATE MDM 30: CPT | Performed by: FAMILY MEDICINE

## 2023-09-06 ENCOUNTER — TELEPHONE (OUTPATIENT)
Dept: FAMILY MEDICINE CLINIC | Age: 73
End: 2023-09-06

## 2023-09-06 RX ORDER — MONTELUKAST SODIUM 10 MG/1
10 TABLET ORAL NIGHTLY
COMMUNITY

## 2023-09-06 RX ORDER — HYDROCODONE BITARTRATE AND ACETAMINOPHEN 5; 325 MG/1; MG/1
1 TABLET ORAL EVERY 6 HOURS PRN
COMMUNITY

## 2023-10-09 ASSESSMENT — ENCOUNTER SYMPTOMS
DIARRHEA: 0
WHEEZING: 1
CHEST TIGHTNESS: 0
ABDOMINAL PAIN: 0
COUGH: 1
CONSTIPATION: 0
COLOR CHANGE: 1
SHORTNESS OF BREATH: 1

## 2023-10-09 NOTE — PROGRESS NOTES
Mental status is at baseline. Psychiatric:         Mood and Affect: Mood normal.         Speech: Speech normal.         Behavior: Behavior normal.         Cognition and Memory: Memory is impaired. Assessment:       Diagnosis Orders   1. Non-pressure chronic ulcer of left lower leg, limited to breakdown of skin ([address])        2. Neuropathy        3. Leg swelling        4. PAF (paroxysmal atrial fibrillation) ([address])        5. Wheezing                  Plan:   Ulcer of the left leg. Worsening. It looks more infected now. I will go ahead and start Keflex 500 milligrams 4 times a day for 10 days. She will see wound care for this. Neuropathy. Stable. I will continue the gabapentin. Apparently, she has been refusing the gabapentin because she does not want to be sleepy so that is something that will be an issue as well. Leg swelling. Stable. Patient continues to have a lot of issues with wounds in the creases of her legs but now that there is fluid build up a lot of it is just adipose tissue. A-fib. Stable. Heart rate has been well controlled. No issues with chest pains or increased shortness of breath with exertion. I will continue to monitor. Wheezing. New. It seems that she may have developed bronchitis. I will treat her with 40 milligrams of prednisone daily for five days. Return in about 1 month (around 10/5/2023) for leg swelling. Patient given educational materials - see patient instructions. Discussed use, benefit, and side effects of prescribed medications. All patient questions answered. Patient voiced understanding. Reviewed health maintenance. Instructed to continue current medications, diet and exercise. Patient agreed with treatment plan. Follow up as directed. Andria Blair am personally transcribing for Jackie Arredondo MD 10/9/23 at 1:45 PM EDT.         Anni Romero MD, personally performed the services described in this document as transcribed by the

## 2023-10-10 ENCOUNTER — OUTSIDE SERVICES (OUTPATIENT)
Dept: PRIMARY CARE CLINIC | Age: 73
End: 2023-10-10
Payer: MEDICARE

## 2023-10-10 DIAGNOSIS — I48.11 LONGSTANDING PERSISTENT ATRIAL FIBRILLATION (HCC): ICD-10-CM

## 2023-10-10 DIAGNOSIS — G62.9 NEUROPATHY: Primary | ICD-10-CM

## 2023-10-10 DIAGNOSIS — L97.922 NON-PRESSURE ULCER OF LEFT LOWER EXTREMITY WITH FAT LAYER EXPOSED (HCC): ICD-10-CM

## 2023-10-10 DIAGNOSIS — R53.1 WEAKNESS: ICD-10-CM

## 2023-10-10 PROCEDURE — 99309 SBSQ NF CARE MODERATE MDM 30: CPT | Performed by: FAMILY MEDICINE

## 2023-10-12 RX ORDER — HYDROXYZINE HYDROCHLORIDE 25 MG/1
25 TABLET, FILM COATED ORAL 3 TIMES DAILY PRN
COMMUNITY

## 2023-10-12 RX ORDER — HYDROXYZINE 50 MG/1
50 TABLET, FILM COATED ORAL 3 TIMES DAILY PRN
COMMUNITY

## 2023-11-01 ENCOUNTER — OUTSIDE SERVICES (OUTPATIENT)
Dept: FAMILY MEDICINE CLINIC | Age: 73
End: 2023-11-01
Payer: MEDICARE

## 2023-11-01 DIAGNOSIS — L97.921 NON-PRESSURE CHRONIC ULCER OF LEFT LOWER LEG, LIMITED TO BREAKDOWN OF SKIN (HCC): ICD-10-CM

## 2023-11-01 DIAGNOSIS — M79.89 LEG SWELLING: ICD-10-CM

## 2023-11-01 DIAGNOSIS — R05.3 POST-COVID CHRONIC COUGH: ICD-10-CM

## 2023-11-01 DIAGNOSIS — U09.9 POST-COVID CHRONIC COUGH: ICD-10-CM

## 2023-11-01 DIAGNOSIS — G62.9 NEUROPATHY: Primary | ICD-10-CM

## 2023-11-01 PROCEDURE — 99309 SBSQ NF CARE MODERATE MDM 30: CPT | Performed by: FAMILY MEDICINE

## 2023-11-02 ASSESSMENT — ENCOUNTER SYMPTOMS
CHEST TIGHTNESS: 0
ABDOMINAL PAIN: 0
DIARRHEA: 0
SHORTNESS OF BREATH: 0
WHEEZING: 0
COUGH: 0
CONSTIPATION: 0

## 2023-11-02 NOTE — PROGRESS NOTES
[facility]  [address]  [address] [address]  Dept: [phone]  Dept Fax: [phone]    Brandi Castelan  is a 72 y.o. female who presents today for her medical conditions/complaints as noted below. Brandi Castelan is c/o of     Chief Complaint   Patient presents with    Leg Swelling    Cough    Peripheral Neuropathy       HPI:   Dennie Chary is being seen for her regular monthly follow up while at [facility]. Overall, patient has been doing better. For the last month, she has been confined to her bed Because she was unable to put her feet up in her recliner chair and this was leading to a lot of increased swelling in her legs with wounds that were not healing. Since being in bed she has been doing significantly better, so she has stayed in bed as this has allowed the swelling to be relieved in her legs, which is allowing the wounds to heal. Patient is not thrilled about being stuck in bed as she is used to being able to move around her house at her convenience, but she realizes that she was not well her legs hanging down. Since being up in her chair the wounds on her legs have healed completely Juana is feeling better overall. Patient did say that her breathing has been well recently she always has a little bit of a cough and that has not changed, but nothing has worsened. She is not feeling particularly short of breath. Patient still has a good appetite. No problems with chest pain or rapid heartbeats. Patient is not feeling particularly anxious or depressed. She is just waiting impatiently for her customized wheelchair to arrive, so she can be able to maneuver around the building but also have her feet up.     Past Medical History:   Diagnosis Date    Longstanding persistent atrial fibrillation ([address]) 12/7/2021    S/P placement of cardiac pacemaker 12/7/2021     History

## 2023-11-20 ASSESSMENT — ENCOUNTER SYMPTOMS
SHORTNESS OF BREATH: 0
WHEEZING: 0
CONSTIPATION: 0
DIARRHEA: 0
ABDOMINAL PAIN: 0
COUGH: 0
CHEST TIGHTNESS: 0

## 2023-11-20 NOTE — PROGRESS NOTES
MD   guaiFENesin-dextromethorphan (ROBITUSSIN DM) 100-10 MG/5ML syrup Take 10 mLs by mouth 4 times daily as needed for Cough  Provider, Madelin, MD   bumetanide (BUMEX) 2 MG tablet Take 1 tablet by mouth daily  Provider, Madelin, MD     Allergies   Allergen Reactions    Penicillins Rash    Sulfa Antibiotics        Subjective:      Review of Systems   Constitutional:  Negative for activity change, appetite change, chills, fatigue and fever. Respiratory:  Negative for cough, chest tightness, shortness of breath and wheezing. Cardiovascular:  Negative for chest pain, palpitations and leg swelling. Gastrointestinal:  Negative for abdominal pain, constipation and diarrhea. Genitourinary:  Negative for difficulty urinating. Skin:  Negative for rash. Neurological:  Negative for dizziness, syncope, weakness, light-headedness and headaches. Psychiatric/Behavioral:  Positive for confusion and decreased concentration. Negative for behavioral problems, dysphoric mood and sleep disturbance. The patient is not nervous/anxious. Objective:     Physical Exam  Vitals and nursing note reviewed. Constitutional:       General: She is not in acute distress. Appearance: She is well-developed. Eyes:      Conjunctiva/sclera: Conjunctivae normal.   Neck:      Thyroid: No thyromegaly. Cardiovascular:      Rate and Rhythm: Normal rate and regular rhythm. Heart sounds: Normal heart sounds. No murmur heard. Pulmonary:      Effort: Pulmonary effort is normal. No respiratory distress. Breath sounds: Normal breath sounds. No wheezing. Abdominal:      General: Abdomen is flat. Bowel sounds are normal.      Palpations: Abdomen is soft. Tenderness: There is no abdominal tenderness. There is no guarding or rebound. Musculoskeletal:      Cervical back: Normal range of motion and neck supple. Comments: Absolutely no swelling in the ankles which is remarkable. No signs of stasis dermatitis.  No

## 2023-12-05 ENCOUNTER — OUTSIDE SERVICES (OUTPATIENT)
Dept: PRIMARY CARE CLINIC | Age: 73
End: 2023-12-05
Payer: MEDICARE

## 2023-12-05 DIAGNOSIS — R05.3 POST-COVID CHRONIC COUGH: ICD-10-CM

## 2023-12-05 DIAGNOSIS — M79.89 LEG SWELLING: ICD-10-CM

## 2023-12-05 DIAGNOSIS — G62.9 NEUROPATHY: Primary | ICD-10-CM

## 2023-12-05 DIAGNOSIS — U09.9 POST-COVID CHRONIC COUGH: ICD-10-CM

## 2023-12-05 DIAGNOSIS — I48.11 LONGSTANDING PERSISTENT ATRIAL FIBRILLATION (HCC): ICD-10-CM

## 2023-12-05 DIAGNOSIS — L97.922 NON-PRESSURE ULCER OF LEFT LOWER EXTREMITY WITH FAT LAYER EXPOSED (HCC): ICD-10-CM

## 2023-12-05 PROCEDURE — 99309 SBSQ NF CARE MODERATE MDM 30: CPT | Performed by: FAMILY MEDICINE

## 2023-12-06 ENCOUNTER — TELEPHONE (OUTPATIENT)
Dept: FAMILY MEDICINE CLINIC | Age: 73
End: 2023-12-06

## 2023-12-06 RX ORDER — SENNOSIDES A AND B 8.6 MG/1
1 TABLET, FILM COATED ORAL EVERY 12 HOURS PRN
COMMUNITY

## 2023-12-15 NOTE — PROGRESS NOTES
MCG/ACT AERO Inhale 2 puffs into the lungs 2 times daily  Madelin Hollingsworth MD   gabapentin (NEURONTIN) 300 MG capsule Take 1 capsule by mouth 3 times daily. Madelin Hollingsworth MD   guaiFENesin (MUCINEX) 600 MG extended release tablet Take 2 tablets by mouth 2 times daily as needed (cough)  Madelin Hollingsworth MD   loperamide (IMODIUM A-D) 2 MG tablet Take 1 tablet by mouth 2 times daily as needed for Diarrhea  Madelin Hollingsworth MD   guaiFENesin-dextromethorphan (ROBITUSSIN DM) 100-10 MG/5ML syrup Take 10 mLs by mouth 4 times daily as needed for Cough  Madelin Hollingsworth MD   bumetanide (BUMEX) 2 MG tablet Take 1 tablet by mouth daily  Madelin Hollingsworth MD     Allergies   Allergen Reactions    Penicillins Rash    Sulfa Antibiotics        Subjective:      Review of Systems   Constitutional:  Negative for activity change, appetite change, chills, fatigue and fever. Eyes:  Positive for discharge (watery). Respiratory:  Negative for cough, chest tightness, shortness of breath and wheezing. Cardiovascular:  Negative for chest pain, palpitations and leg swelling. Gastrointestinal:  Negative for abdominal pain, constipation and diarrhea. Genitourinary:  Negative for difficulty urinating. Skin:  Negative for rash. Neurological:  Negative for dizziness, syncope, weakness, light-headedness and headaches. Psychiatric/Behavioral:  Positive for confusion and decreased concentration. Negative for behavioral problems, dysphoric mood and sleep disturbance. The patient is not nervous/anxious. Objective:     Physical Exam  Vitals and nursing note reviewed. Constitutional:       General: She is not in acute distress. Appearance: She is well-developed. Eyes:      Conjunctiva/sclera: Conjunctivae normal.      Comments: Eyes are watering bilaterally. Neck:      Thyroid: No thyromegaly. Cardiovascular:      Rate and Rhythm: Normal rate and regular rhythm.       Heart sounds: Normal

## 2024-01-02 ENCOUNTER — OUTSIDE SERVICES (OUTPATIENT)
Dept: FAMILY MEDICINE CLINIC | Age: 74
End: 2024-01-02
Payer: MEDICARE

## 2024-01-02 DIAGNOSIS — M79.89 LEG SWELLING: ICD-10-CM

## 2024-01-02 DIAGNOSIS — G62.9 NEUROPATHY: Primary | ICD-10-CM

## 2024-01-02 DIAGNOSIS — U09.9 POST-COVID CHRONIC COUGH: ICD-10-CM

## 2024-01-02 DIAGNOSIS — R05.3 POST-COVID CHRONIC COUGH: ICD-10-CM

## 2024-01-02 DIAGNOSIS — S81.802S LEG WOUND, LEFT, SEQUELA: ICD-10-CM

## 2024-01-02 PROBLEM — G31.84 MILD COGNITIVE IMPAIRMENT OF UNCERTAIN OR UNKNOWN ETIOLOGY: Status: RESOLVED | Noted: 2021-12-06 | Resolved: 2024-01-02

## 2024-01-02 PROBLEM — Z86.16 PERSONAL HISTORY OF COVID-19: Status: RESOLVED | Noted: 2021-12-19 | Resolved: 2024-01-02

## 2024-01-02 PROBLEM — L97.921 ULCER OF LEFT LOWER LEG, LIMITED TO BREAKDOWN OF SKIN (HCC): Status: RESOLVED | Noted: 2021-10-18 | Resolved: 2024-01-02

## 2024-01-02 PROBLEM — L03.116 CELLULITIS OF LEFT LOWER EXTREMITY: Status: RESOLVED | Noted: 2023-07-03 | Resolved: 2024-01-02

## 2024-01-02 PROBLEM — L97.912 NON-PRESSURE ULCER OF LOWER EXTREMITY WITH FAT LAYER EXPOSED, RIGHT (HCC): Status: RESOLVED | Noted: 2023-07-20 | Resolved: 2024-01-02

## 2024-01-02 PROBLEM — L97.922 NON-PRESSURE ULCER OF LEFT LOWER EXTREMITY WITH FAT LAYER EXPOSED (HCC): Status: RESOLVED | Noted: 2023-07-20 | Resolved: 2024-01-02

## 2024-01-02 PROCEDURE — 99309 SBSQ NF CARE MODERATE MDM 30: CPT | Performed by: FAMILY MEDICINE

## 2024-01-02 ASSESSMENT — PATIENT HEALTH QUESTIONNAIRE - PHQ9
SUM OF ALL RESPONSES TO PHQ QUESTIONS 1-9: 0
SUM OF ALL RESPONSES TO PHQ9 QUESTIONS 1 & 2: 0
2. FEELING DOWN, DEPRESSED OR HOPELESS: 0
SUM OF ALL RESPONSES TO PHQ QUESTIONS 1-9: 0
1. LITTLE INTEREST OR PLEASURE IN DOING THINGS: 0

## 2024-01-03 ENCOUNTER — TELEPHONE (OUTPATIENT)
Dept: FAMILY MEDICINE CLINIC | Age: 74
End: 2024-01-03

## 2024-01-03 RX ORDER — TETRAHYDROZOLINE HCL 0.05 %
1 DROPS OPHTHALMIC (EYE)
COMMUNITY

## 2024-01-09 ASSESSMENT — ENCOUNTER SYMPTOMS
SINUS PRESSURE: 1
CONSTIPATION: 0
CHEST TIGHTNESS: 0
ABDOMINAL PAIN: 0
WHEEZING: 0
DIARRHEA: 0
SHORTNESS OF BREATH: 0
COUGH: 1

## 2024-01-09 NOTE — PROGRESS NOTES
[facility] A [facility] OF [facility]  [address] [facility] [address]  Dept: [phone]  Dept Fax: [phone]  Loc: [phone]    Ofelia Hernandez  is a 73 y.o. female who presents today for her medical conditions/complaints as noted below.  Ofelia Hernandez is c/o of     Chief Complaint   Patient presents with    Leg Swelling    Wound Check    Cough       HPI:   Ofelia is being seen for her regular monthly follow up while at [place].     Overall, patient has been doing pretty well. Her hallway currently has COVID, but she has not had any positive tests for herself yet. She is quite congested which is a very common complaint of hers. She said that her nose is stuffy, and she has a very mild cough. She periodically needs treated with over-the-counter allergy medications because of the cough and congestion, so I will restart those again for her. Otherwise, patient is enjoying being able to sit in her new wheelchair, it gets her up out of bed. She has been able to keep her feet elevated much more often and when she is lying in bed her feet are also elevated which has helped tremendously with her leg swelling and the wounds on her legs. She is not having any problems with chest pains or shortness of breath. In general, she is feeling pretty good. The pain in her legs from the neuropathy seems to have improved dramatically since having the swelling improve. Her appetite has been good. No recent issues with bowel problems. She did have that one that episode of blood in the stool over a year ago and that has not reoccurred.      Past Medical History:   Diagnosis Date    Longstanding persistent atrial fibrillation (HCC) 12/7/2021    S/P placement of cardiac pacemaker 12/7/2021     History reviewed. No pertinent surgical history.  History reviewed. No pertinent family history.    Social History     Tobacco Use    Smoking status: Never

## 2024-02-06 ENCOUNTER — OUTSIDE SERVICES (OUTPATIENT)
Dept: FAMILY MEDICINE CLINIC | Age: 74
End: 2024-02-06
Payer: MEDICARE

## 2024-02-06 DIAGNOSIS — R05.3 POST-COVID CHRONIC COUGH: ICD-10-CM

## 2024-02-06 DIAGNOSIS — F03.A0 MILD DEMENTIA WITHOUT BEHAVIORAL DISTURBANCE, PSYCHOTIC DISTURBANCE, MOOD DISTURBANCE, OR ANXIETY, UNSPECIFIED DEMENTIA TYPE (HCC): ICD-10-CM

## 2024-02-06 DIAGNOSIS — M79.89 LEG SWELLING: ICD-10-CM

## 2024-02-06 DIAGNOSIS — U09.9 POST-COVID CHRONIC COUGH: ICD-10-CM

## 2024-02-06 DIAGNOSIS — M62.81 MUSCLE WEAKNESS (GENERALIZED): ICD-10-CM

## 2024-02-06 DIAGNOSIS — I48.11 LONGSTANDING PERSISTENT ATRIAL FIBRILLATION (HCC): ICD-10-CM

## 2024-02-06 DIAGNOSIS — G62.9 NEUROPATHY: Primary | ICD-10-CM

## 2024-02-06 PROCEDURE — 99309 SBSQ NF CARE MODERATE MDM 30: CPT | Performed by: FAMILY MEDICINE

## 2024-02-07 ENCOUNTER — TELEPHONE (OUTPATIENT)
Dept: FAMILY MEDICINE CLINIC | Age: 74
End: 2024-02-07

## 2024-02-26 ASSESSMENT — ENCOUNTER SYMPTOMS
WHEEZING: 0
SHORTNESS OF BREATH: 0
COUGH: 0
CONSTIPATION: 0
CHEST TIGHTNESS: 0
ABDOMINAL PAIN: 0
DIARRHEA: 0

## 2024-02-26 NOTE — PROGRESS NOTES
[facility]  [address] [facility] [address]  Dept: [phone]  Dept Fax: [phone]  Loc: [phone]    Ofelia Hernandez  is a 73 y.o. female who presents today for her medical conditions/complaints as noted below.  Ofelia Hernandez is c/o of     Chief Complaint   Patient presents with    Leg Swelling    Peripheral Neuropathy    Cough    Extremity Weakness       HPI:   Ofelia is being seen for her regular monthly follow up while at [place].     Overall, patient is doing pretty well. She continues to have problems with her legs getting swollen if she does leave them dangled. She has been doing much better about keeping them elevated since she spends a lot more time in her bed now. She does have her wheelchair that she uses frequently, so she is able to participate in activities like bingo and things like that and she does keep her legs a little bit more elevated when she is in her wheelchair but if her legs are hanging down, they do swell quite quickly. Her legs looked great though. She was lying in bed. There are no signs of any open wounds. She said the neuropathy really is much better since she has been keeping her legs elevated so she was not having any problems with pain in her legs either. She said her breathing was doing a little bit better too. She was not noticing near as much of a cough, that does seem to come and go depending on the weather and viruses going around the building. She denied any issues with chest pains. Patient said no problems with abdominal pain or constipation. She does have intermittent diarrhea which she could not really pinpoint the cause of it, but she said it was not bothering a whole lot. No problems with headaches on a regular basis. Patient was not having any problems with feeling depressed or anxious recently.           Past Medical History:   Diagnosis Date    Longstanding

## 2024-03-05 ENCOUNTER — OUTSIDE SERVICES (OUTPATIENT)
Dept: FAMILY MEDICINE CLINIC | Age: 74
End: 2024-03-05
Payer: MEDICARE

## 2024-03-05 DIAGNOSIS — M79.89 LEG SWELLING: ICD-10-CM

## 2024-03-05 DIAGNOSIS — R05.3 POST-COVID CHRONIC COUGH: ICD-10-CM

## 2024-03-05 DIAGNOSIS — U09.9 POST-COVID CHRONIC COUGH: ICD-10-CM

## 2024-03-05 DIAGNOSIS — G62.9 NEUROPATHY: Primary | ICD-10-CM

## 2024-03-05 PROCEDURE — 99309 SBSQ NF CARE MODERATE MDM 30: CPT | Performed by: FAMILY MEDICINE

## 2024-03-07 ENCOUNTER — TELEPHONE (OUTPATIENT)
Dept: FAMILY MEDICINE CLINIC | Age: 74
End: 2024-03-07

## 2024-03-20 ASSESSMENT — ENCOUNTER SYMPTOMS
WHEEZING: 0
DIARRHEA: 0
CONSTIPATION: 0
SHORTNESS OF BREATH: 0
ABDOMINAL PAIN: 0
COUGH: 0

## 2024-03-20 NOTE — PROGRESS NOTES
[facility] A DEPARTMENT OF [facility]  [address] [facility] [address]  Dept: [phone]  Dept Fax: [phone]  Loc: [phone]    Ofelia Hernandez  is a 73 y.o. female who presents today for her medical conditions/complaints as noted below.  Ofelia Hernandez is c/o of     Chief Complaint   Patient presents with    Leg Swelling    Cough    Peripheral Neuropathy       HPI:   Ofelia is being seen for her regular monthly follow up while at [place].     Overall, patient has been doing ok. She has been having a lot of pain in her shoulder on the right side since she fell. Patient said that her range of motion is normal, but it is very sore, and it hurts when she lays down and hurts when she moves her arm too much. Patient has not been asking for anything extra to take for it, but she wonders if there is something that she can do to make the pain better. Otherwise, patient is happy with how things are going. Patient has not been feeling short of breath and she has not been coughing recently. No problems with chest pains. No problems with regular headaches. Leg swelling has been completely resolved which is wonderful.     Past Medical History:   Diagnosis Date    Longstanding persistent atrial fibrillation (HCC) 12/7/2021    S/P placement of cardiac pacemaker 12/7/2021     History reviewed. No pertinent surgical history.  History reviewed. No pertinent family history.    Social History     Tobacco Use    Smoking status: Never    Smokeless tobacco: Never   Substance Use Topics    Alcohol use: Never      Prior to Visit Medications    Medication Sig Taking? Authorizing Provider   tetrahydrozoline 0.05 % ophthalmic solution Place 1 drop into both eyes every 2 hours as needed (dry / watering eyes)  Provider, Historical, MD   senna (SENOKOT) 8.6 MG tablet Take 1 tablet by mouth every 12 hours as needed for Constipation  ProviderMadelin MD

## 2024-04-02 ENCOUNTER — OUTSIDE SERVICES (OUTPATIENT)
Dept: PRIMARY CARE CLINIC | Age: 74
End: 2024-04-02
Payer: MEDICARE

## 2024-04-02 DIAGNOSIS — R05.3 POST-COVID CHRONIC COUGH: ICD-10-CM

## 2024-04-02 DIAGNOSIS — M79.89 LEG SWELLING: ICD-10-CM

## 2024-04-02 DIAGNOSIS — I48.11 LONGSTANDING PERSISTENT ATRIAL FIBRILLATION (HCC): ICD-10-CM

## 2024-04-02 DIAGNOSIS — G62.9 NEUROPATHY: Primary | ICD-10-CM

## 2024-04-02 DIAGNOSIS — U09.9 POST-COVID CHRONIC COUGH: ICD-10-CM

## 2024-04-02 PROCEDURE — 99309 SBSQ NF CARE MODERATE MDM 30: CPT | Performed by: FAMILY MEDICINE

## 2024-04-03 ENCOUNTER — TELEPHONE (OUTPATIENT)
Dept: FAMILY MEDICINE CLINIC | Age: 74
End: 2024-04-03

## 2024-04-23 ASSESSMENT — ENCOUNTER SYMPTOMS
SHORTNESS OF BREATH: 0
DIARRHEA: 0
COUGH: 1
SORE THROAT: 1
CONSTIPATION: 0
CHEST TIGHTNESS: 0
ABDOMINAL PAIN: 0
WHEEZING: 0

## 2024-04-23 NOTE — PROGRESS NOTES
& Plan   Return in about 1 month (around 5/2/2024) for cough.       Patient given educational materials - see patient instructions.  Discussed use, benefit, and side effects of prescribed medications.  All patient questions answered. Patient voiced understanding. Reviewed health maintenance.  Instructed to continue current medications, diet and exercise.  Patient agreed with treatment plan. Follow up as directed.         Diandra BERGERON am personally transcribing for An Villanueva MD 4/23/24 at 10:32 AM EDT.        An BERGERON MD, personally performed the services described in this document as transcribed by the , and it is both accurate and complete.    Electronically signed by An Villanueva MD on 4/23/24 at 5:21 PM EDT

## 2024-05-01 ENCOUNTER — OUTSIDE SERVICES (OUTPATIENT)
Dept: FAMILY MEDICINE CLINIC | Age: 74
End: 2024-05-01
Payer: MEDICARE

## 2024-05-01 DIAGNOSIS — F03.A0 MILD DEMENTIA WITHOUT BEHAVIORAL DISTURBANCE, PSYCHOTIC DISTURBANCE, MOOD DISTURBANCE, OR ANXIETY, UNSPECIFIED DEMENTIA TYPE (HCC): ICD-10-CM

## 2024-05-01 DIAGNOSIS — M79.89 LEG SWELLING: ICD-10-CM

## 2024-05-01 DIAGNOSIS — U09.9 POST-COVID CHRONIC COUGH: ICD-10-CM

## 2024-05-01 DIAGNOSIS — I48.11 LONGSTANDING PERSISTENT ATRIAL FIBRILLATION (HCC): ICD-10-CM

## 2024-05-01 DIAGNOSIS — R05.3 POST-COVID CHRONIC COUGH: ICD-10-CM

## 2024-05-01 DIAGNOSIS — G62.9 NEUROPATHY: Primary | ICD-10-CM

## 2024-05-01 PROCEDURE — 99309 SBSQ NF CARE MODERATE MDM 30: CPT | Performed by: FAMILY MEDICINE

## 2024-05-15 ASSESSMENT — ENCOUNTER SYMPTOMS
COUGH: 0
DIARRHEA: 0
SHORTNESS OF BREATH: 0
CONSTIPATION: 0
CHEST TIGHTNESS: 0

## 2024-05-15 NOTE — PROGRESS NOTES
[facility] A DEPARTMENT OF [facility]  [address] [facility] [address]  Dept: [phone]  Dept Fax: [phone]  Loc: [phone]    Ofelia Hernandez  is a 74 y.o. female who presents today for her medical conditions/complaints as noted below.  Ofelia Hernandez is c/o of     Chief Complaint   Patient presents with    Leg Swelling    Peripheral Neuropathy    Atrial Fibrillation       HPI:   Ofelia is being seen for her regular monthly follow up while at [place].     Overall, patient has been doing pretty well. She said that her leg pain is nearly completely resolved, and she was not having any problems at all with her legs. Therefore, I discussed with her changing her gabapentin dose to BID instead of TID and she was agreeable to that. Otherwise, patient did say that she has been having some diarrhea. She said this is new since being at [place], but it has been going on off and on for several months to years. I asked if this could be food related and she said that it probably was, but she could not quite pinpoint which foods were causing her problems. Patient said that she continues to have a cough and a sore throat. The inhalers and the cold medicine have not helped at all. I asked her if she had any issues with heartburn or reflux and she said that she did not, but I am starting to wonder if that could be the cause of her cough and sore throat, because so far nothing that I have done that is supposed to help her lungs has made any difference at all. Patient denied any problems with rapid heartbeats, and she felt like overall her heart was beating normally.    Past Medical History:   Diagnosis Date    Longstanding persistent atrial fibrillation (HCC) 12/7/2021    S/P placement of cardiac pacemaker 12/7/2021     History reviewed. No pertinent surgical history.  History reviewed. No pertinent family history.    Social History     Tobacco

## 2024-06-04 ENCOUNTER — OUTSIDE SERVICES (OUTPATIENT)
Dept: FAMILY MEDICINE CLINIC | Age: 74
End: 2024-06-04
Payer: MEDICARE

## 2024-06-04 DIAGNOSIS — F03.A0 MILD DEMENTIA WITHOUT BEHAVIORAL DISTURBANCE, PSYCHOTIC DISTURBANCE, MOOD DISTURBANCE, OR ANXIETY, UNSPECIFIED DEMENTIA TYPE (HCC): ICD-10-CM

## 2024-06-04 DIAGNOSIS — U09.9 POST-COVID CHRONIC COUGH: ICD-10-CM

## 2024-06-04 DIAGNOSIS — R05.3 POST-COVID CHRONIC COUGH: ICD-10-CM

## 2024-06-04 DIAGNOSIS — K21.9 GASTRO-ESOPHAGEAL REFLUX DISEASE WITHOUT ESOPHAGITIS: ICD-10-CM

## 2024-06-04 DIAGNOSIS — G62.9 NEUROPATHY: Primary | ICD-10-CM

## 2024-06-04 PROCEDURE — 99309 SBSQ NF CARE MODERATE MDM 30: CPT | Performed by: FAMILY MEDICINE

## 2024-06-05 ENCOUNTER — TELEPHONE (OUTPATIENT)
Dept: FAMILY MEDICINE CLINIC | Age: 74
End: 2024-06-05

## 2024-06-24 ASSESSMENT — ENCOUNTER SYMPTOMS
DIARRHEA: 0
COUGH: 1
SHORTNESS OF BREATH: 0
WHEEZING: 0
CONSTIPATION: 0
CHEST TIGHTNESS: 0
ABDOMINAL PAIN: 0

## 2024-06-24 NOTE — PROGRESS NOTES
[facility] A DEPARTMENT OF [facility]  [address] [facility] [address]  Dept: [phone]  Dept Fax: [phone]  Loc: [phone]    Ofelia Hernandez  is a 74 y.o. female who presents today for her medical conditions/complaints as noted below.  Ofelia Hernandez is c/o of     Chief Complaint   Patient presents with    Leg Swelling    Cough    Gastroesophageal Reflux       HPI:   Ofelia is being seen for her regular monthly follow up  while at [place].     Overall, patient has been doing pretty well. No real changes. The only major concern that she had is that she has been having a throbbing burning sensation in her right foot. Patient has noticed this over the last several week since she has been working with physical therapy. She is not sure why it would be hurting. She does not remember any sort of injury. She does not walk hardly at all anymore. Patient has not had any bruising or swelling of the area. I told her that this may have something to do with the decreased gabapentin dose, which I did a month ago and I told her I am sorry that it appears decreasing the gabapentin has not been successful because she was having no neuropathy pain prior. I will increase her dosing back up to three times a day to help with her pain. Patient was relived to know that there was a reason for the pain because she was very confused as to why she was having it since she had not had any injuries. Otherwise, patient was not having problems. No abdominal pain, constipation, or diarrhea. She generally was feeling pretty good. Patient said that she was still coughing pretty regularly and having some occasionally coughing fits at night. She said that cough medicine helps occasionally when she takes it. Last week, I thought that perhaps she was having a acid reflux issue and that was causing her cough, so I had put her on some omeprazole to try to help but she

## 2024-07-09 ENCOUNTER — OUTSIDE SERVICES (OUTPATIENT)
Dept: FAMILY MEDICINE CLINIC | Age: 74
End: 2024-07-09
Payer: MEDICARE

## 2024-07-09 ENCOUNTER — TELEPHONE (OUTPATIENT)
Dept: FAMILY MEDICINE CLINIC | Age: 74
End: 2024-07-09

## 2024-07-09 DIAGNOSIS — G62.9 NEUROPATHY: Primary | ICD-10-CM

## 2024-07-09 DIAGNOSIS — R05.3 POST-COVID CHRONIC COUGH: ICD-10-CM

## 2024-07-09 DIAGNOSIS — M79.89 LEG SWELLING: ICD-10-CM

## 2024-07-09 DIAGNOSIS — I48.0 PAF (PAROXYSMAL ATRIAL FIBRILLATION) (HCC): ICD-10-CM

## 2024-07-09 DIAGNOSIS — U09.9 POST-COVID CHRONIC COUGH: ICD-10-CM

## 2024-07-09 PROCEDURE — 99309 SBSQ NF CARE MODERATE MDM 30: CPT | Performed by: FAMILY MEDICINE

## 2024-07-09 RX ORDER — BACLOFEN 10 MG/1
10 TABLET ORAL 2 TIMES DAILY
COMMUNITY

## 2024-07-11 ASSESSMENT — ENCOUNTER SYMPTOMS
SORE THROAT: 1
WHEEZING: 0
ABDOMINAL PAIN: 0
CHEST TIGHTNESS: 0
SHORTNESS OF BREATH: 0
CONSTIPATION: 0
DIARRHEA: 0
COUGH: 1

## 2024-07-11 NOTE — PROGRESS NOTES
[facility] A DEPARTMENT OF [facility]  [address] [facility] [address]  Dept: [phone]  Dept Fax: [phone]  Loc: [phone]    Ofelia Hernandez  is a 74 y.o. female who presents today for her medical conditions/complaints as noted below.  Ofelia Hernandez is c/o of     Chief Complaint   Patient presents with    Leg Pain    Cough    Leg Swelling       HPI:   Ofelia is being seen for her regular monthly follow up  while at [place].     Overall, the patient has been doing about the same. In the last couple of days, she has had more of a cold than she normally does. She has had a chronic cough since she had COVID several years ago but in the last couple of days her cough has increased in frequency and is a little harsher than normal. Not really productive but she does have occasionally have some phlegm in the back of her throat. She is also noticed that she has had a little bit of a sore throat since this cough has worsened and she has had a headache as well. She said Robitussin does help and she is taking that pretty regularly. She is not feeling short of breath, and she has not noticed any wheezing. I did ask her if she had been tested for COVID and she said that she had not been but no one else around her has been sick or coughing at all. She did report that she had some diarrhea yesterday but otherwise her bowels have been relatively normal. Last month when I saw her, she was complaining of increased pain in her legs that seem to be a neuropathy type of pain based on her description. I had previously decreased her gabapentin which I suspect is why the neuropathy was worse. So last month I did go ahead and increase her gabapentin back to what she had been on previously and she said she is now feeling much better. She denied any problems with abdominal pain or constipation she said her appetite is still excellent and she had eaten

## 2024-08-06 ENCOUNTER — OUTSIDE SERVICES (OUTPATIENT)
Dept: FAMILY MEDICINE CLINIC | Age: 74
End: 2024-08-06
Payer: MEDICARE

## 2024-08-06 DIAGNOSIS — R51.9 CHRONIC NONINTRACTABLE HEADACHE, UNSPECIFIED HEADACHE TYPE: ICD-10-CM

## 2024-08-06 DIAGNOSIS — G62.9 NEUROPATHY: Primary | ICD-10-CM

## 2024-08-06 DIAGNOSIS — R05.3 POST-COVID CHRONIC COUGH: ICD-10-CM

## 2024-08-06 DIAGNOSIS — M25.512 ACUTE PAIN OF LEFT SHOULDER: ICD-10-CM

## 2024-08-06 DIAGNOSIS — G89.29 CHRONIC NONINTRACTABLE HEADACHE, UNSPECIFIED HEADACHE TYPE: ICD-10-CM

## 2024-08-06 DIAGNOSIS — F03.A0 MILD DEMENTIA WITHOUT BEHAVIORAL DISTURBANCE, PSYCHOTIC DISTURBANCE, MOOD DISTURBANCE, OR ANXIETY, UNSPECIFIED DEMENTIA TYPE (HCC): ICD-10-CM

## 2024-08-06 DIAGNOSIS — F32.A DEPRESSIVE DISORDER: ICD-10-CM

## 2024-08-06 DIAGNOSIS — U09.9 POST-COVID CHRONIC COUGH: ICD-10-CM

## 2024-08-06 PROCEDURE — 99309 SBSQ NF CARE MODERATE MDM 30: CPT | Performed by: FAMILY MEDICINE

## 2024-08-07 ENCOUNTER — TELEPHONE (OUTPATIENT)
Dept: FAMILY MEDICINE CLINIC | Age: 74
End: 2024-08-07

## 2024-08-08 ASSESSMENT — ENCOUNTER SYMPTOMS
SHORTNESS OF BREATH: 0
COUGH: 1
CONSTIPATION: 0
DIARRHEA: 0
CHEST TIGHTNESS: 0
WHEEZING: 0
ABDOMINAL PAIN: 0

## 2024-08-08 NOTE — PROGRESS NOTES
[facility] DEPART[facility] OF [facility]  [address] [facility] [address]  Dept: [phone]  Dept Fax: [phone]  Loc: [phone]    Ofelia Hernandez  is a 74 y.o. female who presents today for her medical conditions/complaints as noted below.  Ofelia Hernandez is c/o of     Chief Complaint   Patient presents with    Peripheral Neuropathy    Cough    Depression       HPI:   Ofelia is being seen for her regular monthly follow up  while at [place].     Overall, she has been doing ok. She did move rooms due to a roommate conflict and she is very happy with her new room. She gets to be by the window, which she did not have previously and where she is at now, she is able to watch the birds and she is really enjoying that. I asked if she was enjoying her new roommate and she seems less convinced of that. She said her roommate spends a lot of time cussing at night which Ofelia does not really appreciate and when you call her out on it the roommate does not admit to doing this. Overall, though it seems that they are managing to get along despite Ofelia's distaste for her way of talking. Otherwise, Ofelia did mention she has been having quite a few headaches. She said that they are pretty bad some days and she is asking for Tylenol most days of the week to try and help with her headache. She said if she takes Tylenol it does improve the headaches, but the nursing staff have mentioned that she should have it looked at because she is needing Tylenol more than they think is appropriate. She is also having a lot of left shoulder pain, which has been really bothering her. She said the pain is worse when the aids are trying to reposition her in bed, but she notices the pain pretty much anytime she moves her arm. She wondered if it was due to coloring too much, but she has always colored a lot, so I find that hard to believe that she over strained her

## 2024-09-03 ENCOUNTER — OUTSIDE SERVICES (OUTPATIENT)
Dept: PRIMARY CARE CLINIC | Age: 74
End: 2024-09-03
Payer: MEDICARE

## 2024-09-03 DIAGNOSIS — G62.9 NEUROPATHY: Primary | ICD-10-CM

## 2024-09-03 DIAGNOSIS — I48.11 LONGSTANDING PERSISTENT ATRIAL FIBRILLATION (HCC): ICD-10-CM

## 2024-09-03 DIAGNOSIS — R05.3 POST-COVID CHRONIC COUGH: ICD-10-CM

## 2024-09-03 DIAGNOSIS — U09.9 POST-COVID CHRONIC COUGH: ICD-10-CM

## 2024-09-03 PROCEDURE — 99309 SBSQ NF CARE MODERATE MDM 30: CPT | Performed by: FAMILY MEDICINE

## 2024-09-04 ENCOUNTER — TELEPHONE (OUTPATIENT)
Dept: FAMILY MEDICINE CLINIC | Age: 74
End: 2024-09-04

## 2024-09-04 RX ORDER — TOPIRAMATE 25 MG/1
25 TABLET, FILM COATED ORAL NIGHTLY
COMMUNITY

## 2024-09-05 ASSESSMENT — ENCOUNTER SYMPTOMS
DIARRHEA: 0
SHORTNESS OF BREATH: 0
ABDOMINAL PAIN: 0
SORE THROAT: 1
COUGH: 1
CONSTIPATION: 0
WHEEZING: 0
CHEST TIGHTNESS: 0

## 2024-09-05 NOTE — PROGRESS NOTES
[facility]  [facility] DEPARTMENT OF [facility]  [address]  [facility] [address]  Dept: [phone]  Dept Fax: [phone]    Ofelia Hernandez  is a 74 y.o. female who presents today for her medical conditions/complaints as noted below.  Ofelia Hernandez is c/o of     Chief Complaint   Patient presents with    Peripheral Neuropathy    Cough    Atrial Fibrillation       HPI:   Ofelia is being seen for her regular monthly follow up  while at [place].     Overall, she has been doing fairly well. Today she was not happy, but she said other than the moment she was seeing her she feels like things have been going well. She was not happy today because she said she has a sore throat which she is blaming on having her bed close to the window. The cold air comes in from the window and that has been giving her a sore throat. She also continues to have her chronic cough which has been present for two years. She does not think it is any worse today it is just bothering her, and she has a little bit of pain in her right ear which she thinks is from ear wax. Otherwise, she said she was feeling a little bit better. She was happy because her shoulders are not hurting near as much. Last month when I saw her, she was complaining of bilateral shoulder pain but had turned down injections, but she was willing to do therapy, so I put in a referral for her. She said since doing therapy she can tell quite a big difference in her pain in her shoulders as well as her range of motion and she was surprised to find the therapist were also going to work on her legs which she was not sure she wanted but she was noticing some improved range of motion in her legs and hips as well. Since she has been in bed she continues to still be doing well as far as her leg swelling it has been very minimal and she has not had any wounds on her lower legs

## 2024-10-08 ENCOUNTER — OUTSIDE SERVICES (OUTPATIENT)
Dept: PRIMARY CARE CLINIC | Age: 74
End: 2024-10-08

## 2024-10-08 DIAGNOSIS — G62.9 NEUROPATHY: Primary | ICD-10-CM

## 2024-10-08 DIAGNOSIS — R05.3 POST-COVID CHRONIC COUGH: ICD-10-CM

## 2024-10-08 DIAGNOSIS — I48.11 LONGSTANDING PERSISTENT ATRIAL FIBRILLATION (HCC): ICD-10-CM

## 2024-10-08 DIAGNOSIS — U09.9 POST-COVID CHRONIC COUGH: ICD-10-CM

## 2024-10-08 DIAGNOSIS — H93.8X3 SENSATION OF FULLNESS IN BOTH EARS: ICD-10-CM

## 2024-10-09 ENCOUNTER — TELEPHONE (OUTPATIENT)
Dept: FAMILY MEDICINE CLINIC | Age: 74
End: 2024-10-09

## 2024-10-10 ASSESSMENT — ENCOUNTER SYMPTOMS
CHEST TIGHTNESS: 0
DIARRHEA: 0
WHEEZING: 0
CONSTIPATION: 0
SHORTNESS OF BREATH: 0
COUGH: 1
ABDOMINAL PAIN: 0

## 2024-10-10 NOTE — PROGRESS NOTES
fullness in both ears - Stable. This has not really improved despite her illness she had last month improving, I will bring an otoscope to look in her ears to see if she needs her ears irrigated.     Assessment & Plan   Return in about 1 month (around 11/8/2024) for cough.       Patient given educational materials - see patient instructions.  Discussed use, benefit, and side effects of prescribed medications.  All patient questions answered. Patient voiced understanding. Reviewed health maintenance.  Instructed to continue current medications, diet and exercise.  Patient agreed with treatment plan. Follow up as directed.         Tasneem BERGERON, alondra personally transcribing for An Villanueva MD 10/10/24 at 3:43 PM EDT.        An BERGERON MD, personally performed the services described in this document as transcribed by the , and it is both accurate and complete.    Electronically signed by An Villanueva MD on 10/10/24 at 5:03 PM EDT

## 2024-11-05 ENCOUNTER — OUTSIDE SERVICES (OUTPATIENT)
Dept: FAMILY MEDICINE CLINIC | Age: 74
End: 2024-11-05

## 2024-11-05 DIAGNOSIS — R05.3 POST-COVID CHRONIC COUGH: ICD-10-CM

## 2024-11-05 DIAGNOSIS — L85.3 DRY SKIN DERMATITIS: ICD-10-CM

## 2024-11-05 DIAGNOSIS — I48.11 LONGSTANDING PERSISTENT ATRIAL FIBRILLATION (HCC): ICD-10-CM

## 2024-11-05 DIAGNOSIS — G62.9 NEUROPATHY: Primary | ICD-10-CM

## 2024-11-05 DIAGNOSIS — H93.8X3 SENSATION OF FULLNESS IN BOTH EARS: ICD-10-CM

## 2024-11-05 DIAGNOSIS — U09.9 POST-COVID CHRONIC COUGH: ICD-10-CM

## 2024-11-06 ENCOUNTER — TELEPHONE (OUTPATIENT)
Dept: FAMILY MEDICINE CLINIC | Age: 74
End: 2024-11-06

## 2024-11-06 ASSESSMENT — ENCOUNTER SYMPTOMS
CHEST TIGHTNESS: 0
SHORTNESS OF BREATH: 0
CONSTIPATION: 0
COUGH: 0
WHEEZING: 0
ABDOMINAL PAIN: 0
DIARRHEA: 0

## 2024-11-06 NOTE — PROGRESS NOTES
[facility] A DEPARTMENT OF [facility]  [address] [facility] [address]  Dept: [phone]  Dept Fax: [phone]  Loc: [phone]    Ofelia Hernandez  is a 74 y.o. female who presents today for her medical conditions/complaints as noted below.  Ofelia Hernandez is c/o of     Chief Complaint   Patient presents with    Ear Fullness    Peripheral Neuropathy    Atrial Fibrillation    Cough       HPI:   Ofelia is being seen for her regular monthly follow up  while at [place].     Overall, she has been doing pretty well. No real new complaints. She continues to have a cough, which is quite bothersome to her, but it has been chronic for several years. She said it is not really worse than normal it is just continuing to be present. Despite her saying she has a cough I have never once heard her cough during our visits, so I am not sure if she is coughing more at night or when the cough is happening. She has still continued to have problems with congestion in her ears, so I did look in her ears and saw that they were quite full of wax. She said she is also had some ringing in her ears that has been going on for quite a while. She has not had any problems with chest pains or shortness or breath, no abdominal pain, constipation, or diarrhea. She denied any problems with headaches. Last month when I saw her, she just had her legs exfoliated and she was upset about it because it was a little bit sore to do so but I told her, her legs look wonderful this week and are very smooth with almost no signs of dry skin. She mentioned then that her arms have had quite a bit of dry skin, and she wondered if there was something we could do for her arms that did not involve intense exfoliation. I told her we could probably do lotion to her arms and asked if that was something she did regularly and she said it is not something that she typically has been doing. She

## 2024-12-03 ENCOUNTER — OUTSIDE SERVICES (OUTPATIENT)
Dept: PRIMARY CARE CLINIC | Age: 74
End: 2024-12-03

## 2024-12-03 DIAGNOSIS — M79.89 LEG SWELLING: ICD-10-CM

## 2024-12-03 DIAGNOSIS — G62.9 NEUROPATHY: Primary | ICD-10-CM

## 2024-12-03 DIAGNOSIS — L85.3 DRY SKIN DERMATITIS: ICD-10-CM

## 2024-12-03 DIAGNOSIS — I48.0 PAF (PAROXYSMAL ATRIAL FIBRILLATION) (HCC): ICD-10-CM

## 2024-12-04 ENCOUNTER — TELEPHONE (OUTPATIENT)
Dept: FAMILY MEDICINE CLINIC | Age: 74
End: 2024-12-04

## 2024-12-05 ASSESSMENT — ENCOUNTER SYMPTOMS
CHEST TIGHTNESS: 0
SHORTNESS OF BREATH: 0
ABDOMINAL PAIN: 0
CONSTIPATION: 1
COUGH: 0
DIARRHEA: 0
WHEEZING: 0

## 2024-12-05 NOTE — PROGRESS NOTES
she could just feel her heart beating. On exam, I did not feel any enlarged or swollen blood vessels, her rate is regular, and her rhythm is regular, so I suspect she was just feeling her pulse occasionally.  Dry skin dermatitis - improving. Nursing staff has done a really good job exfoliating her legs so she has almost no dry skin left and her legs look really good, so she will just continue to use the lotion to keep that under control.    Assessment & Plan   Return in about 1 month (around 1/3/2025) for neuropathy follow up.       Patient given educational materials - see patient instructions.  Discussed use, benefit, and side effects of prescribed medications.  All patient questions answered. Patient voiced understanding. Reviewed health maintenance.  Instructed to continue current medications, diet and exercise.  Patient agreed with treatment plan. Follow up as directed.         Tasneem BERGERON, am personally transcribing for An Villanueva MD 12/5/24 at 5:30 PM EST.        An BERGERON MD, personally performed the services described in this document as transcribed by the , and it is both accurate and complete.    Electronically signed by An Villanueva MD on 12/6/24 at 10:53 AM EST

## 2025-01-07 ENCOUNTER — OUTSIDE SERVICES (OUTPATIENT)
Dept: FAMILY MEDICINE CLINIC | Age: 75
End: 2025-01-07

## 2025-01-07 DIAGNOSIS — I48.0 PAF (PAROXYSMAL ATRIAL FIBRILLATION) (HCC): Primary | ICD-10-CM

## 2025-01-07 DIAGNOSIS — M79.89 LEG SWELLING: ICD-10-CM

## 2025-01-07 DIAGNOSIS — F03.A0 MILD DEMENTIA WITHOUT BEHAVIORAL DISTURBANCE, PSYCHOTIC DISTURBANCE, MOOD DISTURBANCE, OR ANXIETY, UNSPECIFIED DEMENTIA TYPE (HCC): ICD-10-CM

## 2025-01-07 DIAGNOSIS — G62.9 NEUROPATHY: ICD-10-CM

## 2025-01-08 ENCOUNTER — TELEPHONE (OUTPATIENT)
Dept: FAMILY MEDICINE CLINIC | Age: 75
End: 2025-01-08

## 2025-01-08 RX ORDER — POLYETHYLENE GLYCOL 3350 17 G/17G
17 POWDER, FOR SOLUTION ORAL DAILY
COMMUNITY

## 2025-01-10 ASSESSMENT — ENCOUNTER SYMPTOMS
SHORTNESS OF BREATH: 0
CHEST TIGHTNESS: 0
WHEEZING: 0
CONSTIPATION: 0
DIARRHEA: 0
COUGH: 0
ABDOMINAL PAIN: 0

## 2025-01-10 NOTE — PROGRESS NOTES
[facility] OF [facility]  [address] [facility] [address]  Dept: [phone]  Dept Fax: [phone]  Loc: [phone]    Ofelia Hernandez  is a 74 y.o. female who presents today for her medical conditions/complaints as noted below.  Ofelia Hernandez is c/o of     Chief Complaint   Patient presents with    Leg Swelling    Cough    Atrial Fibrillation       HPI:   Ofelia is being seen for her regular monthly follow up  while at [place].     A couple of weeks ago she had a really bad cough that was productive of sputum, and she had a fever, based on community prevalence, I did treat her for pneumonia, she said she is feeling much better, she still has a little bit of a cough, but she has had a chronic cough for years now that has not really improved with any intervention that I have done. She said she is occasionally still coughing a little bit of mucus but that has improved as well. No recent fevers and she is not feeling short of breath, and she also denied any issues with wheezing. She says her appetite has been good and she has been eating quite well. She has not had any problems with her bowels, although in the last couple of days she had a little bit of diarrhea, but she has not had any problems with belly pain and no nausea or vomiting. Her legs continue to look great since she stays in bed with her legs elevated and she has not had any leg swelling in quite a long time. Physical therapy's goal for her is still for her to get out of bed and she has still not accomplished that, but she does think about it occasionally.     Past Medical History:   Diagnosis Date    Longstanding persistent atrial fibrillation (HCC) 12/7/2021    S/P placement of cardiac pacemaker 12/7/2021     History reviewed. No pertinent surgical history.  History reviewed. No pertinent family history.    Social History     Tobacco Use    Smoking status:

## 2025-02-05 ENCOUNTER — OFFICE VISIT (OUTPATIENT)
Dept: FAMILY MEDICINE CLINIC | Age: 75
End: 2025-02-05

## 2025-02-05 ENCOUNTER — TELEPHONE (OUTPATIENT)
Dept: FAMILY MEDICINE CLINIC | Age: 75
End: 2025-02-05

## 2025-02-05 DIAGNOSIS — K59.1 FUNCTIONAL DIARRHEA: ICD-10-CM

## 2025-02-05 DIAGNOSIS — G62.9 NEUROPATHY: ICD-10-CM

## 2025-02-05 DIAGNOSIS — U09.9 POST-COVID CHRONIC COUGH: Primary | ICD-10-CM

## 2025-02-05 DIAGNOSIS — I48.0 PAF (PAROXYSMAL ATRIAL FIBRILLATION) (HCC): ICD-10-CM

## 2025-02-05 DIAGNOSIS — R05.3 POST-COVID CHRONIC COUGH: Primary | ICD-10-CM

## 2025-02-05 ASSESSMENT — ENCOUNTER SYMPTOMS
COUGH: 1
CONSTIPATION: 0
DIARRHEA: 1
ABDOMINAL PAIN: 0
SHORTNESS OF BREATH: 0
WHEEZING: 0
COLOR CHANGE: 1
CHEST TIGHTNESS: 0

## 2025-02-06 NOTE — PROGRESS NOTES
[facility] A DEPARTMENT OF [facility]  [address] [facility] [address]  Dept: [phone]  Dept Fax: [phone]  Loc: [phone]    Ofelia Hernandez  is a 74 y.o. female who presents today for her medical conditions/complaints as noted below.  Ofelia Hernandez is c/o of   Chief Complaint   Patient presents with    Cough    Peripheral Neuropathy    Hypertension       HPI:     History of Present Illness  The patient is a 74-year-old female here today for a follow-up of her chronic cough, neuropathy, and hypertension.    She reports an exacerbation of her chronic cough, characterized by significant phlegm production. She has not experienced any episodes of wheezing. She has not been prescribed any cold medications for her cough. She has not been utilizing albuterol inhaler or nebulizer treatments. She continues to use Dulera inhaler. She has a history of smoking but has been abstinent for the past 4 years.    She has been experiencing intermittent diarrhea, which was also reported during her previous visit. The severity of the diarrhea was such that it prevented her from consuming food on Sunday night. She experienced a single episode of vomiting but reports an improvement in her abdominal symptoms today. She was able to consume breakfast without any issues. She has not experienced any febrile episodes. She has not been prescribed Imodium for her diarrhea. She believes she had a normal bowel movement today.    She reports no issues with her legs and has not experienced any recent episodes of intense exfoliation. She underwent a comprehensive exfoliation procedure a few months ago due to stasis dermatitis, which resulted in dry, flaky skin on her legs. She has a bruise on her leg, the cause of which is unknown. She also has a red spot on her big toe, the origin of which is uncertain. She has not been ambulatory for some time now.    She reports

## 2025-03-05 ENCOUNTER — OFFICE VISIT (OUTPATIENT)
Dept: FAMILY MEDICINE CLINIC | Age: 75
End: 2025-03-05

## 2025-03-05 DIAGNOSIS — U09.9 POST-COVID CHRONIC COUGH: ICD-10-CM

## 2025-03-05 DIAGNOSIS — G62.9 NEUROPATHY: Primary | ICD-10-CM

## 2025-03-05 DIAGNOSIS — K21.9 GASTRO-ESOPHAGEAL REFLUX DISEASE WITHOUT ESOPHAGITIS: ICD-10-CM

## 2025-03-05 DIAGNOSIS — I48.0 PAF (PAROXYSMAL ATRIAL FIBRILLATION) (HCC): ICD-10-CM

## 2025-03-05 DIAGNOSIS — R05.3 POST-COVID CHRONIC COUGH: ICD-10-CM

## 2025-03-05 RX ORDER — FAMOTIDINE 20 MG/1
20 TABLET, FILM COATED ORAL NIGHTLY
Qty: 30 TABLET | Refills: 0 | OUTPATIENT
Start: 2025-03-05

## 2025-03-05 ASSESSMENT — ENCOUNTER SYMPTOMS
WHEEZING: 0
SHORTNESS OF BREATH: 0
CONSTIPATION: 0
NAUSEA: 0
COUGH: 1
DIARRHEA: 1
ABDOMINAL PAIN: 1
VOMITING: 0
CHEST TIGHTNESS: 0

## 2025-03-05 NOTE — PROGRESS NOTES
[facility] DEPARTMENT OF [facility]  [address] [facility] [address]  Dept: [phone]  Dept Fax: [phone]  Loc: [phone]    Ofelia Hernandez  is a 74 y.o. female who presents today for her medical conditions/complaints as noted below.  Ofelia Hernandez is c/o of   Chief Complaint   Patient presents with    Leg Swelling    Peripheral Neuropathy    Gastroesophageal Reflux    Atrial Fibrillation    Cough       HPI:     History of Present Illness  The patient is a 74-year-old female seen today for follow-up of her leg swelling, peripheral neuropathy, GERD, atrial fibrillation, and cough.    She reports no recent issues with her lower extremities. Her skin appears healthy and well-moisturized, with no signs of dryness. She does not engage in excessive daytime napping and maintains a good appetite. Gabapentin continues to be effective in managing her leg pain.    She experiences occasional shortness of breath but has no chest pain.    She has been experiencing abdominal discomfort, the cause of which remains unknown to her. She also reports intermittent diarrhea. She has been using MiraLAX for constipation. She does not experience exacerbation of abdominal pain postprandially. She reports upper abdominal pain and heartburn but has no history of frequent heartburn at home. She also reports a sensation of dryness in her mouth.    She has a persistent dry cough.    She has no symptoms suggestive of atrial fibrillation exacerbation, such as palpitations. She is currently on Eliquis for atrial fibrillation management.        Past Medical History:   Diagnosis Date    Longstanding persistent atrial fibrillation (HCC) 12/7/2021    S/P placement of cardiac pacemaker 12/7/2021     History reviewed. No pertinent surgical history.    History reviewed. No pertinent family history.    Social History     Tobacco Use    Smoking status: Never    Smokeless